# Patient Record
Sex: MALE | Race: WHITE
[De-identification: names, ages, dates, MRNs, and addresses within clinical notes are randomized per-mention and may not be internally consistent; named-entity substitution may affect disease eponyms.]

---

## 2019-11-12 ENCOUNTER — HOSPITAL ENCOUNTER (EMERGENCY)
Dept: HOSPITAL 63 - ER | Age: 29
LOS: 1 days | Discharge: ANOTHER HOSPITAL | End: 2019-11-13
Payer: SELF-PAY

## 2019-11-12 VITALS — BODY MASS INDEX: 33.06 KG/M2 | HEIGHT: 65 IN | WEIGHT: 198.42 LBS

## 2019-11-12 DIAGNOSIS — E86.0: ICD-10-CM

## 2019-11-12 DIAGNOSIS — K80.50: Primary | ICD-10-CM

## 2019-11-12 DIAGNOSIS — Z88.1: ICD-10-CM

## 2019-11-12 DIAGNOSIS — K59.00: ICD-10-CM

## 2019-11-12 DIAGNOSIS — E83.42: ICD-10-CM

## 2019-11-12 DIAGNOSIS — D72.829: ICD-10-CM

## 2019-11-12 PROCEDURE — 85379 FIBRIN DEGRADATION QUANT: CPT

## 2019-11-12 PROCEDURE — 74177 CT ABD & PELVIS W/CONTRAST: CPT

## 2019-11-12 PROCEDURE — 82150 ASSAY OF AMYLASE: CPT

## 2019-11-12 PROCEDURE — 99285 EMERGENCY DEPT VISIT HI MDM: CPT

## 2019-11-12 PROCEDURE — 86803 HEPATITIS C AB TEST: CPT

## 2019-11-12 PROCEDURE — 84484 ASSAY OF TROPONIN QUANT: CPT

## 2019-11-12 PROCEDURE — 81001 URINALYSIS AUTO W/SCOPE: CPT

## 2019-11-12 PROCEDURE — 83735 ASSAY OF MAGNESIUM: CPT

## 2019-11-12 PROCEDURE — 96361 HYDRATE IV INFUSION ADD-ON: CPT

## 2019-11-12 PROCEDURE — 85025 COMPLETE CBC W/AUTO DIFF WBC: CPT

## 2019-11-12 PROCEDURE — 82550 ASSAY OF CK (CPK): CPT

## 2019-11-12 PROCEDURE — 87340 HEPATITIS B SURFACE AG IA: CPT

## 2019-11-12 PROCEDURE — 93005 ELECTROCARDIOGRAM TRACING: CPT

## 2019-11-12 PROCEDURE — 96375 TX/PRO/DX INJ NEW DRUG ADDON: CPT

## 2019-11-12 PROCEDURE — 71046 X-RAY EXAM CHEST 2 VIEWS: CPT

## 2019-11-12 PROCEDURE — 76705 ECHO EXAM OF ABDOMEN: CPT

## 2019-11-12 PROCEDURE — 36415 COLL VENOUS BLD VENIPUNCTURE: CPT

## 2019-11-12 PROCEDURE — 80076 HEPATIC FUNCTION PANEL: CPT

## 2019-11-12 PROCEDURE — 80307 DRUG TEST PRSMV CHEM ANLYZR: CPT

## 2019-11-12 PROCEDURE — 96365 THER/PROPH/DIAG IV INF INIT: CPT

## 2019-11-12 PROCEDURE — 84443 ASSAY THYROID STIM HORMONE: CPT

## 2019-11-12 PROCEDURE — 86709 HEPATITIS A IGM ANTIBODY: CPT

## 2019-11-12 PROCEDURE — 74019 RADEX ABDOMEN 2 VIEWS: CPT

## 2019-11-12 PROCEDURE — 80048 BASIC METABOLIC PNL TOTAL CA: CPT

## 2019-11-12 PROCEDURE — 85730 THROMBOPLASTIN TIME PARTIAL: CPT

## 2019-11-12 PROCEDURE — 85610 PROTHROMBIN TIME: CPT

## 2019-11-12 PROCEDURE — 86705 HEP B CORE ANTIBODY IGM: CPT

## 2019-11-12 PROCEDURE — 96372 THER/PROPH/DIAG INJ SC/IM: CPT

## 2019-11-12 PROCEDURE — 83690 ASSAY OF LIPASE: CPT

## 2019-11-12 NOTE — ED.ADGEN
Past History


Past Medical History


Deaf,  palate defect, - at birth





Adult General


Chief Complaint


Chief Complaint


".. This all started on a trip to [facility].. for baby shower.. I notice 

on the way down there.. .I started getting this stomach, chest pain.. I thought 

it was just that I was hungry... but I got down there and tried to eat.. and it 

was like knifes in my chest and stomach.. that was on this past Saturday... and 

ever since.. I have not been able to eat solids.. I can get some liquids down...

but the pain is no better..." .. " I can't hardly walk, or step off a curb... 

with the pain..."





HPI


HPI





Patient is a 29 year old male who presents with above hx and complaints of chest

epigastric and right upper quadrant abdomen pain since this past Saturday on 

.  Patient states nothing makes the pain better. Movement makes the 

pain worse. Eating solids makes the pain worse.  No history of GERD or reflux.  

No history of gallbladder disease with him or family members. Patient does have 

a history of palate disorder and deafness.  Had a replacement of titanium bone 

in ear and now has some hearing.  No specific history of bad food. Recent travel

to [facility]. No history of immunosuppression.  Has tried Pepto-Bismol at 

home  and had only increased  abdomen discomfort.   Pt. describes his pain as 

severe.





Review of Systems


Review of Systems





Constitutional: Denies fever or chills []


Eyes: Denies change in visual acuity, redness, or eye pain []


HENT: Denies nasal congestion or sore throat []


Respiratory: Denies cough or shortness of breath []


Cardiovascular: No additional information not addressed in HPI []


GI: Patient has complaints of epigastric abdominal pain, nausea,. No vomiting, 

bloody stools or diarrhea []


: Denies dysuria or hematuria []


Musculoskeletal: Denies back pain or joint pain []


Integument: Denies rash or skin lesions []


Neurologic: Denies headache, focal weakness or sensory changes []


Endocrine: Denies polyuria or polydipsia []





All other systems were reviewed and found to be within normal limits, except as 

documented in this note.





Family History


Family History


Noncontributory





Current Medications


Current Medications





Current Medications








 Medications


  (Trade)  Dose


 Ordered  Sig/Carol  Start Time


 Stop Time Status Last Admin


Dose Admin


 


 Famotidine


  (Pepcid Vial)  20 mg  1X  ONCE  19 00:30


 19 00:31 DC 19 00:52


20 MG


 


 Hydromorphone HCl


  (Dilaudid)  2 mg  1X  ONCE  19 06:00


 19 05:57 DC  





 


 Info


  (Do NOT chart on


 this entry -- for


 MONITORING)  1 each  PRN DAILY  PRN  19 00:30


 19 05:57 DC  





 


 Iohexol


  (Omnipaque 240


 Mg/ml)  30 ml  1X  ONCE  19 01:00


 19 01:01 DC 19 01:35


30 ML


 


 Iohexol


  (Omnipaque 300


 Mg/ml)  75 ml  1X  ONCE  19 01:00


 19 01:01 DC 19 01:35


75 ML


 


 Lactated Ringer's  1,000 ml @ 


 1,000 mls/hr  1X  ONCE  19 04:30


 19 05:29 DC 19 03:40


1,000 MLS/HR


 


 Levofloxacin/


 Dextrose  100 ml @ 


 100 mls/hr  1X  ONCE  19 05:30


 19 05:57 DC 19 05:39


100 MLS/HR


 


 Magnesium


 Hydroxide


  (Milk Of


 Magnesia)  2,400 mg  1X  ONCE  19 00:30


 19 00:31 DC 19 00:52


2,400 MG


 


 Metronidazole  100 ml @ 


 100 mls/hr  1X  ONCE  19 05:30


 19 05:57 DC  





 


 Metronidazole


  (Flagyl)  500 mg  1X  ONCE  19 03:00


 19 03:01 DC 19 03:06


500 MG


 


 Morphine Sulfate


  (Morphine 10mg


 Syringe)  10 mg  1X  ONCE  19 03:30


 19 03:31 DC 19 03:06


10 MG


 


 Ondansetron HCl


  (Zofran)  8 mg  1X  ONCE  19 03:30


 19 03:31 DC 19 03:06


8 MG


 


 Sucralfate


  (Carafate)  1 gm  1X  ONCE  19 00:30


 19 00:31 DC 19 00:52


1 GM


 


 Trimethoprim/


 Sulfamethoxazole


  (Bactrim Ds)  1 tab  1X  ONCE  19 03:00


 19 03:01 DC 19 03:06


1 TAB





See nursing for home meds





Allergies


Allergies





Allergies








Coded Allergies Type Severity Reaction Last Updated Verified


 


  amoxicillin Allergy Intermediate  19 Yes


 


  cefaclor Allergy Intermediate  19 Yes


 


  clavulanic acid Allergy Intermediate  19 Yes











Physical Exam


Physical Exam





Constitutional: Moderately acute distress, non-toxic appearance. Tearful. 


HENT: Normocephalic, atraumatic, bilateral external ears normal, oropharynx 

moist, no oral exudates, nose normal. Old mastoid surgery scars


Eyes: PERRLA, EOMI, conjunctiva normal, no discharge. [] 


Neck: Normal range of motion, no tenderness, supple, no stridor. [] 


Cardiovascular: Tachycardia Heart rate regular rhythm, no murmur []


Lungs & Thorax:  Bilateral breath sounds equal at apex on auscultation []


Abdomen: Bowel sounds normal, soft, right upper quadrant and epi gastric 

tenderness, no masses, no pulsatile masses. Climbs rectal exam at this time. (No

 history of tarry stools).  Rebound to right upper quadrant and epigastric.


Skin: Warm, dry, no erythema, no rash. [] Multiple tattoos.


Back: No tenderness, no CVA tenderness. [] 


Extremities: No tenderness, no cyanosis, no clubbing, ROM intact, no edema. [] 

No true psoas sign. 


Neurologic: Alert and oriented X 3, normal motor function, normal sensory 

function, no focal deficits noted. []


Psychologic: Affect anxious, judgement normal, mood normal. []





Current Patient Data


Vital Signs





                                   Vital Signs








  Date Time  Temp Pulse Resp B/P (MAP) Pulse Ox O2 Delivery O2 Flow Rate FiO2


 


19 05:07  95 20 116/62 (80) 97 Room Air  


 


19 23:20 99.0       








Lab Results





                                Laboratory Tests








Test


 19


23:44 19


23:57


 


Urine Collection Type Unknown   


 


Urine Color Yellow   


 


Urine Clarity Clear   


 


Urine pH 7.0   


 


Urine Specific Gravity 1.020   


 


Urine Protein


 Neg


(NEG-TRACE) 





 


Urine Glucose (UA)


 Neg mg/dL


(NEG) 





 


Urine Ketones (Stick)


 Neg mg/dL


(NEG) 





 


Urine Blood Neg (NEG)   


 


Urine Nitrite Neg (NEG)   


 


Urine Bilirubin Neg (NEG)   


 


Urine Urobilinogen Dipstick


 0.2 mg/dL (0.2


mg/dL) 





 


Urine Leukocyte Esterase Neg (NEG)   


 


Urine RBC 0 /HPF (0-2)   


 


Urine WBC


 1-4 /HPF (0-4)


 





 


Urine Squamous Epithelial


Cells Occ /LPF  


 





 


Urine Amorphous Sediment Present /HPF   


 


Urine Bacteria


 Few /HPF


(0-FEW) 





 


Urine Opiates Screen Neg (NEG)   


 


Urine Methadone Screen Neg (NEG)   


 


Urine Barbiturates Neg (NEG)   


 


Urine Phencyclidine Screen Neg (NEG)   


 


Urine


Amphetamine/Methamphetamine Neg (NEG)  


 





 


Urine Benzodiazepines Screen Neg (NEG)   


 


Urine Cocaine Screen Neg (NEG)   


 


Urine Cannabinoids Screen Neg (NEG)   


 


Urine Ethyl Alcohol Neg (NEG)   


 


White Blood Count


 


 14.3 x10^3/uL


(4.0-11.0)  H


 


Red Blood Count


 


 6.27 x10^6/uL


(4.30-5.70)  H


 


Hemoglobin


 


 17.7 g/dL


(13.0-17.5)  H


 


Hematocrit


 


 52.8 %


(39.0-53.0)


 


Mean Corpuscular Volume


 


 84 fL ()





 


Mean Corpuscular Hemoglobin  28 pg (25-35)  


 


Mean Corpuscular Hemoglobin


Concent 


 34 g/dL


(31-37)


 


Red Cell Distribution Width


 


 14.0 %


(11.5-14.5)


 


Platelet Count


 


 265 x10^3/uL


(140-400)


 


Neutrophils (%) (Auto)  70 % (31-73)  


 


Lymphocytes (%) (Auto)  20 % (24-48)  L


 


Monocytes (%) (Auto)  7 % (0-9)  


 


Eosinophils (%) (Auto)  2 % (0-3)  


 


Basophils (%) (Auto)  1 % (0-3)  


 


Neutrophils # (Auto)


 


 10.1 x10^3uL


(1.8-7.7)  H


 


Lymphocytes # (Auto)


 


 2.9 x10^3/uL


(1.0-4.8)


 


Monocytes # (Auto)


 


 1.0 x10^3/uL


(0.0-1.1)


 


Eosinophils # (Auto)


 


 0.3 x10^3/uL


(0.0-0.7)


 


Basophils # (Auto)


 


 0.1 x10^3/uL


(0.0-0.2)


 


Prothrombin Time


 


 10.6 SEC


(9.4-11.4)


 


Prothrombin Time INR  1.0 (0.9-1.1)  


 


Activated Partial


Thromboplast Time 


 23 SEC (23-33)





 


D-Dimer (Mariel)


 


 0.52 mg/L


(0.00-0.50)  H


 


Sodium Level


 


 143 mmol/L


(136-145)


 


Potassium Level


 


 3.6 mmol/L


(3.5-5.1)


 


Chloride Level


 


 105 mmol/L


()


 


Carbon Dioxide Level


 


 32 mmol/L


(21-32)


 


Anion Gap  6 (6-14)  


 


Blood Urea Nitrogen


 


 15 mg/dL


(8-26)


 


Creatinine


 


 1.0 mg/dL


(0.7-1.3)


 


Estimated GFR


(Cockcroft-Gault) 


 89.6  





 


Glucose Level


 


 135 mg/dL


(70-99)  H


 


Calcium Level


 


 7.8 mg/dL


(8.5-10.1)  L


 


Magnesium Level


 


 1.6 mg/dL


(1.8-2.4)  L


 


Total Bilirubin


 


 0.2 mg/dL


(0.2-1.0)


 


Direct Bilirubin


 


 0.1 mg/dL


(0.0-0.2)


 


Aspartate Amino Transferase


(AST) 


 12 U/L (15-37)


L


 


Alanine Aminotransferase (ALT)


 


 27 U/L (16-63)





 


Alkaline Phosphatase


 


 71 U/L


()


 


Creatine Kinase


 


 40 U/L


()


 


Troponin I Quantitative


 


 < 0.017 ng/mL


(0-0.055)


 


Total Protein


 


 6.5 g/dL


(6.4-8.2)


 


Albumin


 


 2.9 g/dL


(3.4-5.0)  L


 


Amylase Level


 


 44 U/L


()


 


Lipase


 


 118 U/L


()











EKG


EKG


My interpretation EKG shows a sinus rhythm at 99 bpm. Does have some bimodal P-

wave is in left leads. Mild left axis. J point findings in V2 through V3 but no 

contralateral changes consistent with STEMI.[]





Radiology/Procedures


Radiology/Procedures


[facility] [address], [place] [address] [phone]


                                        


                                 IMAGING REPORT





                                     Signed





PATIENT: RAÚL NAVARRO SACCOUNT: [id number]     MRN#: [id number]


: 1990           LOCATION: ER              AGE: 29


SEX: M                    EXAM DT: 19         ACCESSION#: [id number]


STATUS: REG ER            ORD. PHYSICIAN: MAXIME MURRAY MD


REASON: Lower chest and abdominal pain, nausea, constipation


PROCEDURE: CHEST PA & LATERAL





CHEST PA   LATERAL


 


History: Lower chest pain.


 


Comparison: None.


 


Findings:


No consolidation or pleural effusion. Normal heart size.


 


Impression: 


1.  No acute cardiopulmonary process.


 


Electronically signed by: Adria Bah DO (2019 2:21 AM) [facility]














DICTATED AND SIGNED BY:     ADRIA BAH DO


DATE:     19





CC: MAXIME MURRAY MD; PCP,NO ~


[facility] [address], [place] [address] [phone]


                                        


                                 IMAGING REPORT





                                     Signed





PATIENT: RAÚL NAVARRO: [id number]     MRN#: [id number]


: 1990           LOCATION: ER              AGE: 29


SEX: M                    EXAM DT: 19         ACCESSION#: [id number]


STATUS: REG ER            ORD. PHYSICIAN: MAXIME MURRAY MD


REASON: Lower chest and abdomen pain, nausea, constipation


PROCEDURE: ABDOMEN SUPINE & UPRIGHT





ABDOMEN SUPINE   UPRIGHT


 


History: Abdominal pain. Nausea. Constipation.


 


Technique: Supine and upright views of the abdomen.


 


Comparison: None.


 


Findings:


No pneumatosis or pneumoperitoneum. Imaged lung bases are unremarkable. 


Minimal small bowel gas. Air and stool scattered throughout the imaged 


colon.


 


Impression: 


1.  Nonobstructed bowel gas pattern.


 


Electronically signed by: Adria Bah DO (2019 2:20 AM) [facility]














DICTATED AND SIGNED BY:     ADRIA BAH DO


DATE:     19





CC: MAXIME MURRAY MD; PCP,NO ~


[[facility] [address], [place] [address] [phone]


                                        


                                 IMAGING REPORT





                                     Signed





PATIENT: RAÚL NAVARRO: [id number]     MRN#: [id number]


: 1990           LOCATION: ER              AGE: 29


SEX: M                    EXAM DT: 19         ACCESSION#: [id number]


STATUS: REG ER            ORD. PHYSICIAN: MAXIME MURRAY MD


REASON: Abdomen pain, nausea


PROCEDURE: CT ABD PELV W/ORAL&IV CONTRAST





CT ABD PELV W/ORAL IV CONTRAST


 


History: Abdominal pain. Nausea.


 


Technique:  After the administration of oral and  intravenous contrast, CT


imaging was performed of the abdomen and pelvis.  Multiplanar images are 


reviewed. 


 


Exposure: One or more of the following individualized dose reduction 


techniques were utilized for this examination:  


1. Automated exposure control  


2. Adjustment of the mA and/or kV according to patient size  


3. Use of iterative reconstruction technique.


 


Comparison:  None


 


Findings:


Lower chest: No consolidation or pleural effusion.


 


Abdomen and pelvis: Hepatic steatosis. The spleen, adrenal glands, 


pancreas and gallbladder are unremarkable. Unremarkable appearance of the 


kidneys. Bilateral extrarenal pelvises. No intrarenal calculi. Minimal 


left pelvocaliectasis.


 


Normal appendix. No evidence of bowel obstruction. Oral contrast opacifies


to the level of the cecum. No pathologic lymphadenopathy. No ascites. 


Pelvic contents are unremarkable.


 


Bones: No pathologic osseous lesions.


 


Impression:


1.  No acute intra-abdominal or pelvic pathology.


2.  Minimal left pelvocaliectasis.


3.  Mild hepatic steatosis.


 


 


Electronically signed by: Adria Bah DO (2019 2:06 AM) [facility]














DICTATED AND SIGNED BY:     ADRIA BAH DO


DATE:     19





CC: MAXIME MURRAY MD; PCP,NO ~


][facility] [address], [place] [address]


                                 [phone]


                                        


                                 IMAGING REPORT





                                     Signed





PATIENT: RAÚL NAVARRO SACCOUNT: [id number]     MRN#: [id number]


: 1990           LOCATION: ER              AGE: 29


SEX: M                    EXAM DT: 19         ACCESSION#: [id number]


STATUS: REG ER            ORD. PHYSICIAN: MAXIME MURRAY MD


REASON: biliary colic


PROCEDURE: ABDOMEN LTD





ABDOMEN LTD


 


History: Right upper quadrant pain.


 


Comparison: CT 2019.


 


Technique: Transabdominal ultrasound images are obtained of the right 


upper quadrant.


 


Findings:


Visualized pancreas is not well seen due to overlying bowel gas.


 


Liver is increased in echogenicity. Right hepatic lobe measures 16.9 cm.  


Portal flow is hepatopedal.


 


Gallbladder sludge. No cholelithiasis. No gallbladder wall thickening.  


 


Common bile duct caliber is normal measuring 6 mm in diameter. 


 


The right kidney measures 12.4 x 4.5 x 3.9 cm in length and is without 


evidence of obstruction or stone.  


 


Visualized portions of the aorta and IVC have normal caliber.


 


IMPRESSION: 


1.  Gallbladder sludge.


 


Electronically signed by: Adria Bah DO (2019 4:39 AM) [facility]














DICTATED AND SIGNED BY:     ADRIA BAH DO


DATE:     19 0439





CC: MAXIME MURRAY MD; PCP,NO ~





Course & Med Decision Making


Course & Med Decision Making


Pertinent Labs and Imaging studies reviewed. (See chart for details)





Pt. unable to achieve any relief after two bolus LR,  and Morphine 10 x 2,  MOM,

Carfate and Pepcid.   





Discussed presentation, testing and tx plan with Dr. Jara- Transfer to his 

service at [facility].   Discussed presentation with Dr. Castillo, he will consult on pt. 

for abd. pain and biliary colic





[]





Final Impression


Final Impression


1. Epigastric and right upper quadrant pain[]


2. Biliary Colic- Gallbladder Sludge


3. Constipation


4.  Hypomagnesium  1.6


5. Leukocytosis  14.3


6. Dehydration





Dragon Disclaimer


Dragon Disclaimer


This electronic medical record was generated, in whole or in part, using a voice

 recognition dictation system.





Dragon Disclaimer


This chart was dictated in whole or in part using Voice Recognition software in 

a busy, high-work load, and often noisy Emergency Department environment.  It 

may contain unintended and wholly unrecognized errors or omissions.





Dragon Disclaimer


This chart was dictated in whole or in part using Voice Recognition software in 

a busy, high-work load, and often noisy Emergency Department environment.  It 

may contain unintended and wholly unrecognized errors or omissions.











MAXIME MURRAY MD           2019 23:24

## 2019-11-13 VITALS — SYSTOLIC BLOOD PRESSURE: 116 MMHG | DIASTOLIC BLOOD PRESSURE: 62 MMHG

## 2019-11-13 LAB
ALBUMIN SERPL-MCNC: 2.9 G/DL (ref 3.4–5)
ALP SERPL-CCNC: 71 U/L (ref 46–116)
ALT SERPL-CCNC: 27 U/L (ref 16–63)
AMORPH SED URNS QL MICRO: PRESENT /HPF
AMPHETAMINE/METHAMPHETAMINE: (no result)
AMYLASE SERPL-CCNC: 44 U/L (ref 25–115)
ANION GAP SERPL CALC-SCNC: 6 MMOL/L (ref 6–14)
APTT PPP: YELLOW S
AST SERPL-CCNC: 12 U/L (ref 15–37)
BACTERIA #/AREA URNS HPF: (no result) /HPF
BARBITURATES UR-MCNC: (no result) UG/ML
BASOPHILS # BLD AUTO: 0.1 X10^3/UL (ref 0–0.2)
BASOPHILS NFR BLD: 1 % (ref 0–3)
BENZODIAZ UR-MCNC: (no result) UG/L
BILIRUB DIRECT SERPL-MCNC: 0.1 MG/DL (ref 0–0.2)
BILIRUB SERPL-MCNC: 0.2 MG/DL (ref 0.2–1)
BILIRUB UR QL STRIP: (no result)
CA-I SERPL ISE-MCNC: 15 MG/DL (ref 8–26)
CALCIUM SERPL-MCNC: 7.8 MG/DL (ref 8.5–10.1)
CANNABINOIDS UR-MCNC: (no result) UG/L
CHLORIDE SERPL-SCNC: 105 MMOL/L (ref 98–107)
CO2 SERPL-SCNC: 32 MMOL/L (ref 21–32)
COCAINE UR-MCNC: (no result) NG/ML
CREAT SERPL-MCNC: 1 MG/DL (ref 0.7–1.3)
EOSINOPHIL NFR BLD: 0.3 X10^3/UL (ref 0–0.7)
EOSINOPHIL NFR BLD: 2 % (ref 0–3)
ERYTHROCYTE [DISTWIDTH] IN BLOOD BY AUTOMATED COUNT: 14 % (ref 11.5–14.5)
FIBRINOGEN PPP-MCNC: CLEAR MG/DL
GFR SERPLBLD BASED ON 1.73 SQ M-ARVRAT: 89.6 ML/MIN
GLUCOSE SERPL-MCNC: 135 MG/DL (ref 70–99)
GLUCOSE UR STRIP-MCNC: (no result) MG/DL
HCT VFR BLD CALC: 52.8 % (ref 39–53)
HGB BLD-MCNC: 17.7 G/DL (ref 13–17.5)
LIPASE: 118 U/L (ref 73–393)
LYMPHOCYTES # BLD: 2.9 X10^3/UL (ref 1–4.8)
LYMPHOCYTES NFR BLD AUTO: 20 % (ref 24–48)
MAGNESIUM SERPL-MCNC: 1.6 MG/DL (ref 1.8–2.4)
MCH RBC QN AUTO: 28 PG (ref 25–35)
MCHC RBC AUTO-ENTMCNC: 34 G/DL (ref 31–37)
MCV RBC AUTO: 84 FL (ref 79–100)
METHADONE SERPL-MCNC: (no result) NG/ML
MONO #: 1 X10^3/UL (ref 0–1.1)
MONOCYTES NFR BLD: 7 % (ref 0–9)
NEUT #: 10.1 X10^3UL (ref 1.8–7.7)
NEUTROPHILS NFR BLD AUTO: 70 % (ref 31–73)
NITRITE UR QL STRIP: (no result)
OPIATES UR-MCNC: (no result) NG/ML
PCP SERPL-MCNC: (no result) MG/DL
PLATELET # BLD AUTO: 265 X10^3/UL (ref 140–400)
POTASSIUM SERPL-SCNC: 3.6 MMOL/L (ref 3.5–5.1)
PROT SERPL-MCNC: 6.5 G/DL (ref 6.4–8.2)
RBC # BLD AUTO: 6.27 X10^6/UL (ref 4.3–5.7)
RBC #/AREA URNS HPF: 0 /HPF (ref 0–2)
SODIUM SERPL-SCNC: 143 MMOL/L (ref 136–145)
SP GR UR STRIP: 1.02
SQUAMOUS #/AREA URNS LPF: (no result) /LPF
UROBILINOGEN UR-MCNC: 0.2 MG/DL
WBC # BLD AUTO: 14.3 X10^3/UL (ref 4–11)
WBC #/AREA URNS HPF: (no result) /HPF (ref 0–4)

## 2019-11-13 NOTE — RAD
CT ABD PELV W/ORAL IV CONTRAST

 

History: Abdominal pain. Nausea.

 

Technique:  After the administration of oral and  intravenous contrast, CT

imaging was performed of the abdomen and pelvis.  Multiplanar images are 

reviewed. 

 

Exposure: One or more of the following individualized dose reduction 

techniques were utilized for this examination:  

1. Automated exposure control  

2. Adjustment of the mA and/or kV according to patient size  

3. Use of iterative reconstruction technique.

 

Comparison:  None

 

Findings:

Lower chest: No consolidation or pleural effusion.

 

Abdomen and pelvis: Hepatic steatosis. The spleen, adrenal glands, 

pancreas and gallbladder are unremarkable. Unremarkable appearance of the 

kidneys. Bilateral extrarenal pelvises. No intrarenal calculi. Minimal 

left pelvocaliectasis.

 

Normal appendix. No evidence of bowel obstruction. Oral contrast opacifies

to the level of the cecum. No pathologic lymphadenopathy. No ascites. 

Pelvic contents are unremarkable.

 

Bones: No pathologic osseous lesions.

 

Impression:

1.  No acute intra-abdominal or pelvic pathology.

2.  Minimal left pelvocaliectasis.

3.  Mild hepatic steatosis.

 

 

Electronically signed by: Adria Bah DO (11/13/2019 2:06 AM) [facility]

## 2019-11-13 NOTE — RAD
CHEST PA   LATERAL

 

History: Lower chest pain.

 

Comparison: None.

 

Findings:

No consolidation or pleural effusion. Normal heart size.

 

Impression: 

1.  No acute cardiopulmonary process.

 

Electronically signed by: Adria Bah DO (11/13/2019 2:21 AM) [facility]

## 2019-11-13 NOTE — RAD
ABDOMEN SUPINE   UPRIGHT

 

History: Abdominal pain. Nausea. Constipation.

 

Technique: Supine and upright views of the abdomen.

 

Comparison: None.

 

Findings:

No pneumatosis or pneumoperitoneum. Imaged lung bases are unremarkable. 

Minimal small bowel gas. Air and stool scattered throughout the imaged 

colon.

 

Impression: 

1.  Nonobstructed bowel gas pattern.

 

Electronically signed by: Adria Bah DO (11/13/2019 2:20 AM) [facility]

## 2019-11-13 NOTE — RAD
ABDOMEN LTD

 

History: Right upper quadrant pain.

 

Comparison: CT November 13, 2019.

 

Technique: Transabdominal ultrasound images are obtained of the right 

upper quadrant.

 

Findings:

Visualized pancreas is not well seen due to overlying bowel gas.

 

Liver is increased in echogenicity. Right hepatic lobe measures 16.9 cm.  

Portal flow is hepatopedal.

 

Gallbladder sludge. No cholelithiasis. No gallbladder wall thickening.  

 

Common bile duct caliber is normal measuring 6 mm in diameter. 

 

The right kidney measures 12.4 x 4.5 x 3.9 cm in length and is without 

evidence of obstruction or stone.  

 

Visualized portions of the aorta and IVC have normal caliber.

 

IMPRESSION: 

1.  Gallbladder sludge.

 

Electronically signed by: Adria Bah DO (11/13/2019 4:39 AM) [facility]

## 2020-01-01 ENCOUNTER — HOSPITAL ENCOUNTER (EMERGENCY)
Dept: HOSPITAL 63 - ER | Age: 30
LOS: 1 days | Discharge: HOME | End: 2020-01-02
Payer: SELF-PAY

## 2020-01-01 VITALS — BODY MASS INDEX: 33.49 KG/M2 | HEIGHT: 65 IN | WEIGHT: 201 LBS

## 2020-01-01 DIAGNOSIS — D72.829: ICD-10-CM

## 2020-01-01 DIAGNOSIS — Z88.1: ICD-10-CM

## 2020-01-01 DIAGNOSIS — K52.9: Primary | ICD-10-CM

## 2020-01-01 DIAGNOSIS — R11.2: ICD-10-CM

## 2020-01-01 DIAGNOSIS — K80.50: ICD-10-CM

## 2020-01-01 LAB
ALBUMIN SERPL-MCNC: 3.8 G/DL (ref 3.4–5)
ALP SERPL-CCNC: 99 U/L (ref 46–116)
ALT SERPL-CCNC: 44 U/L (ref 16–63)
AMPHETAMINE/METHAMPHETAMINE: (no result)
AMYLASE SERPL-CCNC: 38 U/L (ref 25–115)
ANION GAP SERPL CALC-SCNC: 9 MMOL/L (ref 6–14)
APTT PPP: YELLOW S
AST SERPL-CCNC: 17 U/L (ref 15–37)
BACTERIA #/AREA URNS HPF: 0 /HPF
BARBITURATES UR-MCNC: (no result) UG/ML
BASOPHILS # BLD AUTO: 0.1 X10^3/UL (ref 0–0.2)
BASOPHILS NFR BLD: 1 % (ref 0–3)
BENZODIAZ UR-MCNC: (no result) UG/L
BILIRUB DIRECT SERPL-MCNC: 0.1 MG/DL (ref 0–0.2)
BILIRUB SERPL-MCNC: 0.6 MG/DL (ref 0.2–1)
BILIRUB UR QL STRIP: (no result)
CA-I SERPL ISE-MCNC: 12 MG/DL (ref 8–26)
CALCIUM SERPL-MCNC: 8.7 MG/DL (ref 8.5–10.1)
CANNABINOIDS UR-MCNC: (no result) UG/L
CHLORIDE SERPL-SCNC: 102 MMOL/L (ref 98–107)
CO2 SERPL-SCNC: 29 MMOL/L (ref 21–32)
COCAINE UR-MCNC: (no result) NG/ML
CREAT SERPL-MCNC: 0.9 MG/DL (ref 0.7–1.3)
EOSINOPHIL NFR BLD: 0.1 X10^3/UL (ref 0–0.7)
EOSINOPHIL NFR BLD: 1 % (ref 0–3)
ERYTHROCYTE [DISTWIDTH] IN BLOOD BY AUTOMATED COUNT: 14.2 % (ref 11.5–14.5)
FIBRINOGEN PPP-MCNC: CLEAR MG/DL
GFR SERPLBLD BASED ON 1.73 SQ M-ARVRAT: 101.2 ML/MIN
GLUCOSE SERPL-MCNC: 90 MG/DL (ref 70–99)
GLUCOSE UR STRIP-MCNC: (no result) MG/DL
HCT VFR BLD CALC: 46.1 % (ref 39–53)
HGB BLD-MCNC: 15.7 G/DL (ref 13–17.5)
INFLUENZA A PATIENT: NEGATIVE
INFLUENZA B PATIENT: NEGATIVE
LIPASE: 102 U/L (ref 73–393)
LYMPHOCYTES # BLD: 0.7 X10^3/UL (ref 1–4.8)
LYMPHOCYTES NFR BLD AUTO: 5 % (ref 24–48)
MCH RBC QN AUTO: 28 PG (ref 25–35)
MCHC RBC AUTO-ENTMCNC: 34 G/DL (ref 31–37)
MCV RBC AUTO: 81 FL (ref 79–100)
METHADONE SERPL-MCNC: (no result) NG/ML
MONO #: 0.6 X10^3/UL (ref 0–1.1)
MONOCYTES NFR BLD: 4 % (ref 0–9)
NEUT #: 11.7 X10^3UL (ref 1.8–7.7)
NEUTROPHILS NFR BLD AUTO: 89 % (ref 31–73)
NITRITE UR QL STRIP: (no result)
OPIATES UR-MCNC: (no result) NG/ML
PCP SERPL-MCNC: (no result) MG/DL
PLATELET # BLD AUTO: 228 X10^3/UL (ref 140–400)
POTASSIUM SERPL-SCNC: 3.7 MMOL/L (ref 3.5–5.1)
PROT SERPL-MCNC: 8.4 G/DL (ref 6.4–8.2)
RBC # BLD AUTO: 5.67 X10^6/UL (ref 4.3–5.7)
RBC #/AREA URNS HPF: 0 /HPF (ref 0–2)
SODIUM SERPL-SCNC: 140 MMOL/L (ref 136–145)
SP GR UR STRIP: >=1.03
SQUAMOUS #/AREA URNS LPF: (no result) /LPF
UROBILINOGEN UR-MCNC: 0.2 MG/DL
WBC # BLD AUTO: 13.2 X10^3/UL (ref 4–11)
WBC #/AREA URNS HPF: (no result) /HPF (ref 0–4)

## 2020-01-01 PROCEDURE — 74022 RADEX COMPL AQT ABD SERIES: CPT

## 2020-01-01 PROCEDURE — 36415 COLL VENOUS BLD VENIPUNCTURE: CPT

## 2020-01-01 PROCEDURE — 80307 DRUG TEST PRSMV CHEM ANLYZR: CPT

## 2020-01-01 PROCEDURE — 82150 ASSAY OF AMYLASE: CPT

## 2020-01-01 PROCEDURE — 83690 ASSAY OF LIPASE: CPT

## 2020-01-01 PROCEDURE — 85610 PROTHROMBIN TIME: CPT

## 2020-01-01 PROCEDURE — 96375 TX/PRO/DX INJ NEW DRUG ADDON: CPT

## 2020-01-01 PROCEDURE — 81001 URINALYSIS AUTO W/SCOPE: CPT

## 2020-01-01 PROCEDURE — 80076 HEPATIC FUNCTION PANEL: CPT

## 2020-01-01 PROCEDURE — 96374 THER/PROPH/DIAG INJ IV PUSH: CPT

## 2020-01-01 PROCEDURE — 74177 CT ABD & PELVIS W/CONTRAST: CPT

## 2020-01-01 PROCEDURE — 80048 BASIC METABOLIC PNL TOTAL CA: CPT

## 2020-01-01 PROCEDURE — 85025 COMPLETE CBC W/AUTO DIFF WBC: CPT

## 2020-01-01 PROCEDURE — 96361 HYDRATE IV INFUSION ADD-ON: CPT

## 2020-01-01 PROCEDURE — 85730 THROMBOPLASTIN TIME PARTIAL: CPT

## 2020-01-01 PROCEDURE — 87804 INFLUENZA ASSAY W/OPTIC: CPT

## 2020-01-01 PROCEDURE — 96372 THER/PROPH/DIAG INJ SC/IM: CPT

## 2020-01-01 PROCEDURE — 99285 EMERGENCY DEPT VISIT HI MDM: CPT

## 2020-01-01 NOTE — PHYS DOC
Past History


Past Medical History:  Other


Past Surgical History:  Other


Additional Past Surgical Histo:  9 EAR SURGERYS AND cleft palate


Alcohol Use:  Rarely


Drug Use:  None





Adult General


Chief Complaint


Chief Complaint:  ".. I am having really bad abdomen pain.. Nuasea and 

vomiting.. and diarrhea.. It really just started in last 24 hrs.... this af

ternoon.. I ve vomited x 6,  and 4 watery green stools... "





HPI


HPI





Patient is a 29 year old male who presents with above hx and complaints of  

generalized abdomen pain.   Patient denies any intake of bad food. No recent t

ravel. No specific ill contacts. Patient denies any history immunosuppression. 

Patient only had 2 alcoholic drinks yesterday. Patient denies any trauma. 

Patient normally healthy. Pain is more likely localized to epigastric and right 

upper quadrant. Patient states pain seems to be worse after high-fat meal. 

Patient denies previous history of gallbladder disease or biliary colic. No 

recent dark or tarry stools. No history of kidney stones. Patient follows with 

Dr. Esposito for [facility]





Review of Systems


Review of Systems





Constitutional: Subjective history of fever or chills []


Eyes: Denies change in visual acuity, redness, or eye pain []


HENT: Denies nasal congestion or sore throat []


Respiratory: Denies cough or shortness of breath []


Cardiovascular: No additional information not addressed in HPI []


GI: Complaints of abdominal pain, nausea, vomiting, and diarrhea []


: Denies dysuria or hematuria []


Musculoskeletal: Denies back pain or joint pain []


Integument: Denies rash or skin lesions []


Neurologic: Denies headache, focal weakness or sensory changes []


Endocrine: Denies polyuria or polydipsia []





All other systems were reviewed and found to be within normal limits, except as 

documented in this note.





Family History


Family History


Noncontributory





Current Medications


Current Medications


See nursing for home medications





Allergies


Allergies





Allergies








Coded Allergies Type Severity Reaction Last Updated Verified


 


  amoxicillin Allergy Intermediate  19 Yes


 


  cefaclor Allergy Intermediate  19 Yes


 


  clavulanic acid Allergy Intermediate  19 Yes











Physical Exam


Physical Exam





Constitutional: Reports he is in acute distress, non-toxic appearance. []


HENT: Normocephalic, atraumatic, bilateral external ears normal, oropharynx dry,

 no oral exudates, nose normal. []


Eyes: PERRLA, EOMI, conjunctiva normal, no discharge. [] 


Neck: Normal range of motion, no tenderness, supple, no stridor. [] 


Cardiovascular:Heart rate regular rhythm, no murmur []


Lungs & Thorax:  Bilateral breath sounds equal apex scattered wheezes 

auscultation []


Abdomen: Bowel sounds hyperactive, soft, generalized tenderness, no masses, no 

pulsatile masses. [] Rebound to right upper quadrant


Skin: Warm, dry, no erythema, no rash. Total right arm covered in tattoos


Back: No tenderness, no CVA tenderness. [] 


Extremities: No tenderness, no cyanosis, no clubbing, ROM intact, no edema. 

[]Mild psoas right


Neurologic: Alert and oriented X 3, normal motor function, normal sensory 

function, no focal deficits noted. []


Psychologic: Affect anxious, judgement normal, mood normal. []





EKG


EKG


[]





Radiology/Procedures


Radiology/Procedures


[][facility]


                                        


                                 [phone]


                                        


                                 IMAGING REPORT





                                     Signed





PATIENT: RAÚL NAVARROOUNT: [id number]     MRN#: [id number]


: 1990           LOCATION: ER              AGE: 29


SEX: M                    EXAM DT: 20         ACCESSION#: [id number]


STATUS: REG ER            ORD. PHYSICIAN: MAXIME MURRAY MD


REASON: Abdomen pain, nausea, vomiting, diarrhea


PROCEDURE: CT ABD PELV W/ORAL&IV CONTRAST





Study: CT abdomen/pelvis with intravenous contrast


 


Indication: Abdominal pain, nausea, vomiting and diarrhea.


 


Comparison: 2019


 


Technique: Helical CT imaging performed of the abdomen and pelvis after 


the intravenous administration of 74 cc Omnipaque 300 contrast. Sagittal 


and coronal reformats were obtained. 


 


One or more of the following individualized dose reduction techniques were


utilized for this examination:  


 


1. Automated exposure control


2. Adjustment of the mA and/or kV according to patient size


3. Use of iterative reconstruction technique.


 


Findings:


 


Mild basilar volume loss.


 


Hepatic steatosis. Unremarkable gallbladder, pancreas, spleen and adrenal 


glands.


 


Unchanged degree of pelvocaliectasis on the left. Symmetric cortical 


enhancement. No hydroureter bilaterally. Unremarkable urinary bladder and 


prostate. A low-attenuation focus at the base of penis in the expected 


location of the bulbous urethra is seen on image 98 series 2 measuring up 


to 2.3 cm. This is unchanged in size from the prior and is relatively 


simple in density.


 


No pericolonic inflammation. Normal appendix. Unremarkable small bowel and


stomach.


 


Unremarkable major vascular structures. No adenopathy. No free fluid or 


air.


 


No acute osseous abnormality. Redemonstrated small bone islands within the


femoral heads. Incompletely formed disc space seen at S1-S2.


 


Impression:


 


1.  No acute abnormality seen throughout the abdomen or pelvis.


2.  Unchanged low-attenuation focus in the expected location of the 


bulbous urethra favored on account of a Cowper duct cyst. This could be 


developmental or acquired and of unlikely clinical significance. The 


appearance of the kidneys and collecting system is unchanged.


3.  Hepatic steatosis.


 


Electronically signed by: JHOANA SWEENEY MD (2020 11:39 PM) [place] [id number]














DICTATED AND SIGNED BY:     JHOANA SWEENEY MD


DATE:     20 4989





CC: MAXIME MURRAY MD; PCP,NO ~


[address] [place] [address]


                                 [phone]


                                        


                                 IMAGING REPORT





                                     Signed





PATIENT: RAÚL NAVARROOUNT: [id number]     MRN#: [id number]


: 1990           LOCATION: ER              AGE: 29


SEX: M                    EXAM DT: 20         ACCESSION#: [id number]


STATUS: REG ER            ORD. PHYSICIAN: MAXIME MURRAY MD


REASON: Abdomen pain, diarrhea, nausea, fatigue


PROCEDURE: ACUTE ABDOMEN SERIES





ACUTE ABDOMEN SERIES


 


History: Abdomen pain, diarrhea, nausea, fatigue.


 


COMPARISON: Abdominal series of 2019.


 


FINDINGS:


Single view the chest is obtained. Cardiomediastinal silhouette is not 


enlarged. No evidence of pneumothorax, pleural effusion or consolidating 


infiltrate. Bones appear to be grossly intact.


 


Splenic shadow may be slightly enlarged. No evidence of pathologic 


calcification. Bowel gas pattern is nonobstructive. Bones appear grossly 


intact. No evidence of free intraperitoneal gas on upright view.


 


IMPRESSION:


1. No consolidating infiltrate in the chest.


2. Nonobstructive bowel gas pattern.


3. Possible mild splenomegaly.


 


Electronically signed by: Yannick Ray MD (2020 9:07 PM) [facility]














DICTATED AND SIGNED BY:     YANNICK RAY MD


DATE:     20 4251





CC: MAXIME MURRAY MD; PCP,NO ~





Course & Med Decision Making


Course & Med Decision Making


Pertinent Labs and Imaging studies reviewed. (See chart for details)





Patient's stay on a clear fluid diet only for the next 48 hours. No solids. No 

milk products. Clear fluids only must allow bowel rest. Takes Zantac 150 mg 

twice day. Take Zofran 8 mg up 4 times day for active vomiting. Tylenol and 

ibuprofen for pain. Follow-up primary care. Return if any concerns.











Impression:





1. Abdomen Pain


2. Acute Gastroenteritis, NVD


3. Mild Leukocytosis  13.2


4. Biliary colic





[]





Dragon Disclaimer


Dragon Disclaimer


This electronic medical record was generated, in whole or in part, using a voice

 recognition dictation system.





Departure


Departure:


Disposition:  01 HOME/RESIDENCE PRIOR TO ADM


Condition:  STABLE


Referrals:  


PCPRYLEE (PCP)


Scripts


Ranitidine Hcl (ZANTAC) 150 Mg Tablet


150 MG PO BID for GERD, Gastritis for 30 Days, #60 TAB


   Prov: MAXIME MURRAY MD         20 


Ondansetron Hcl (ZOFRAN) 8 Mg Tablet


8 MG PO QIDPRN PRN for active nause and vomiting, #30 BOTTLE


   Prov: MAXIME MURRAY MD         20





Dragon Disclaimer


This chart was dictated in whole or in part using Voice Recognition software in 

a busy, high-work load, and often noisy Emergency Department environment.  It 

may contain unintended and wholly unrecognized errors or omissions.





Dragon Disclaimer


This chart was dictated in whole or in part using Voice Recognition software in 

a busy, high-work load, and often noisy Emergency Department environment.  It 

may contain unintended and wholly unrecognized errors or omissions.











MAXIME MURRAY MD            2020 20:09

## 2020-01-01 NOTE — RAD
ACUTE ABDOMEN SERIES

 

History: Abdomen pain, diarrhea, nausea, fatigue.

 

COMPARISON: Abdominal series of 11/13/2019.

 

FINDINGS:

Single view the chest is obtained. Cardiomediastinal silhouette is not 

enlarged. No evidence of pneumothorax, pleural effusion or consolidating 

infiltrate. Bones appear to be grossly intact.

 

Splenic shadow may be slightly enlarged. No evidence of pathologic 

calcification. Bowel gas pattern is nonobstructive. Bones appear grossly 

intact. No evidence of free intraperitoneal gas on upright view.

 

IMPRESSION:

1. No consolidating infiltrate in the chest.

2. Nonobstructive bowel gas pattern.

3. Possible mild splenomegaly.

 

Electronically signed by: Yannick Ray MD (1/1/2020 9:07 PM) [facility]

## 2020-01-01 NOTE — RAD
Study: CT abdomen/pelvis with intravenous contrast

 

Indication: Abdominal pain, nausea, vomiting and diarrhea.

 

Comparison: 11/13/2019

 

Technique: Helical CT imaging performed of the abdomen and pelvis after 

the intravenous administration of 74 cc Omnipaque 300 contrast. Sagittal 

and coronal reformats were obtained. 

 

One or more of the following individualized dose reduction techniques were

utilized for this examination:  

 

1. Automated exposure control

2. Adjustment of the mA and/or kV according to patient size

3. Use of iterative reconstruction technique.

 

Findings:

 

Mild basilar volume loss.

 

Hepatic steatosis. Unremarkable gallbladder, pancreas, spleen and adrenal 

glands.

 

Unchanged degree of pelvocaliectasis on the left. Symmetric cortical 

enhancement. No hydroureter bilaterally. Unremarkable urinary bladder and 

prostate. A low-attenuation focus at the base of penis in the expected 

location of the bulbous urethra is seen on image 98 series 2 measuring up 

to 2.3 cm. This is unchanged in size from the prior and is relatively 

simple in density.

 

No pericolonic inflammation. Normal appendix. Unremarkable small bowel and

stomach.

 

Unremarkable major vascular structures. No adenopathy. No free fluid or 

air.

 

No acute osseous abnormality. Redemonstrated small bone islands within the

femoral heads. Incompletely formed disc space seen at S1-S2.

 

Impression:

 

1.  No acute abnormality seen throughout the abdomen or pelvis.

2.  Unchanged low-attenuation focus in the expected location of the 

bulbous urethra favored on account of a Cowper duct cyst. This could be 

developmental or acquired and of unlikely clinical significance. The 

appearance of the kidneys and collecting system is unchanged.

3.  Hepatic steatosis.

 

Electronically signed by: JHOANA SWEENEY MD (1/1/2020 11:39 PM) [facility]

## 2020-01-02 VITALS — SYSTOLIC BLOOD PRESSURE: 136 MMHG | DIASTOLIC BLOOD PRESSURE: 70 MMHG

## 2020-03-09 ENCOUNTER — HOSPITAL ENCOUNTER (EMERGENCY)
Dept: HOSPITAL 63 - ER | Age: 30
Discharge: HOME | End: 2020-03-09
Payer: SELF-PAY

## 2020-03-09 VITALS
WEIGHT: 206.79 LBS | DIASTOLIC BLOOD PRESSURE: 79 MMHG | BODY MASS INDEX: 34.45 KG/M2 | SYSTOLIC BLOOD PRESSURE: 112 MMHG | HEIGHT: 65 IN

## 2020-03-09 DIAGNOSIS — R51: ICD-10-CM

## 2020-03-09 DIAGNOSIS — K08.89: Primary | ICD-10-CM

## 2020-03-09 DIAGNOSIS — K03.81: ICD-10-CM

## 2020-03-09 DIAGNOSIS — Z88.1: ICD-10-CM

## 2020-03-09 PROCEDURE — 99283 EMERGENCY DEPT VISIT LOW MDM: CPT

## 2020-03-09 PROCEDURE — 96372 THER/PROPH/DIAG INJ SC/IM: CPT

## 2020-03-09 NOTE — PHYS DOC
Past History


Past Medical History:  No Pertinent History


Past Surgical History:  Other


Additional Past Surgical Histo:  9 EAR SURGERYS AND cleft palate


Alcohol Use:  Rarely


Drug Use:  None





Adult General


Chief Complaint


Chief Complaint:  EARACHE/EAR PAIN





HPI


HPI





Patient is a 29-year-old male presenting with chief complaint of cracked tooth 

increased right face pain


x two weeks slowly getting worse





Current Medications


Current Medications





Current Medications








 Medications


  (Trade)  Dose


 Ordered  Sig/Carol  Start Time


 Stop Time Status Last Admin


Dose Admin


 


 Ketorolac


 Tromethamine


  (Toradol Im)  30 mg  1X  ONCE  3/9/20 01:15


 3/9/20 01:16 UNV  














Allergies


Allergies





Allergies








Coded Allergies Type Severity Reaction Last Updated Verified


 


  amoxicillin Allergy Intermediate  11/12/19 Yes


 


  cefaclor Allergy Intermediate  11/12/19 Yes


 


  clavulanic acid Allergy Intermediate  11/12/19 Yes











Physical Exam


Physical Exam





Constitutional: Well developed, well nourished, no acute distress, non-toxic 

appearance. []


HENT: Normocephalic, atraumatic, bilateral external ears normal, oropharynx 

moist, no oral exudates, nose normal. []cracked tooth


no lad


no appreciable abscess or facial swelling


mild facial ttp


Eyes: PERRLA, EOMI, conjunctiva normal, no discharge. [] 


Neck: Normal range of motion, no tenderness, supple, no stridor. [] 


Abdomen:  soft, no tenderness, no masses, no pulsatile masses. [] 


Skin: Warm, dry, no erythema, no rash. [] 


Back: No tenderness, no CVA tenderness. [] 


Extremities: No tenderness, no cyanosis, no clubbing, ROM intact, no edema. [] 


Neurologic: Alert and oriented X 3, normal motor function, normal sensory 

function, no focal deficits noted. []


Psychologic: Affect normal, judgement normal, mood normal. []





EKG


EKG


[]





Radiology/Procedures


Radiology/Procedures


[]





Course & Med Decision Making


Course & Med Decision Making


Pertinent Labs and Imaging studies reviewed. (See chart for details)





[]abx and pain meds were given


advised see dentist this week.





Dragon Disclaimer


Dragon Disclaimer


This electronic medical record was generated, in whole or in part, using a voice

 recognition dictation system.





Departure


Departure:


Impression:  


   Primary Impression:  


   Toothache


Disposition:  01 HOME, SELF-CARE


Condition:  STABLE


Patient Instructions:  Toothache-Brief


Scripts


Hydrocodone Bit/Acetaminophen (NORCO 5-325 TABLET) 1 Each Tablet


1-2 TAB PO Q4-6HRS PRN for NAUSEA/VOMITING, #8 TAB


   Prov: YASMEEN BALBUENA MD         3/9/20 


Clindamycin Hcl (CLINDAMYCIN HCL) 300 Mg Capsule


1 CAP PO TID for tooth, #21 CAP


   Prov: YASMEEN BALBUENA MD         3/9/20











YASMEEN BALBUENA MD             Mar 9, 2020 01:19

## 2020-03-25 ENCOUNTER — HOSPITAL ENCOUNTER (EMERGENCY)
Dept: HOSPITAL 63 - ER | Age: 30
Discharge: HOME | End: 2020-03-25
Payer: SELF-PAY

## 2020-03-25 VITALS — HEIGHT: 65 IN | WEIGHT: 205.03 LBS | BODY MASS INDEX: 34.16 KG/M2

## 2020-03-25 VITALS — SYSTOLIC BLOOD PRESSURE: 146 MMHG | DIASTOLIC BLOOD PRESSURE: 71 MMHG
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
## 2020-03-25 DIAGNOSIS — Z88.1: ICD-10-CM
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
## 2020-03-25 DIAGNOSIS — S05.02XA: Primary | ICD-10-CM

## 2020-03-25 PROCEDURE — 99283 EMERGENCY DEPT VISIT LOW MDM: CPT

## 2020-03-25 NOTE — PHYS DOC
Past History


Past Medical History:  No Pertinent History


Past Surgical History:  Other


Additional Past Surgical Histo:  9 EAR SURGERYS AND cleft palate


Alcohol Use:  Rarely


Drug Use:  None





Adult General


Chief Complaint


Chief Complaint:  EYE PROBLEMS





HPI


HPI





Patient is a 30-year-old male who presents with complaint of left eye pain after

accidentally scratching his eye with contact lens. Patient denies any loss of 

vision. He just reports that eyes painful and red. He rates pain as moderate. 

Injury occurred this morning.[]





Review of Systems


Review of Systems





Constitutional: Denies fever or chills []


Eyes: Denies change in visual acuity. Complains of eye redness and pain []


Respiratory: Denies cough or shortness of breath []


Cardiovascular: No additional information not addressed in HPI []


Neurologic: Denies headache, focal weakness or sensory changes []





Current Medications


Current Medications





Current Medications








 Medications


  (Trade)  Dose


 Ordered  Sig/Carol  Start Time


 Stop Time Status Last Admin


Dose Admin


 


 Erythromycin


  (Romycin)  0.5 inch  1X  STAT  3/25/20 22:35


 3/25/20 22:38 DC 3/25/20 22:42


0.5 INCH


 


 Fluorescein Sodium


  (Ful-Nani 1mg)  1 strip  1X  ONCE  3/25/20 23:00


 3/25/20 23:01  3/25/20 22:42


1 STRIP











Allergies


Allergies





Allergies








Coded Allergies Type Severity Reaction Last Updated Verified


 


  amoxicillin Allergy Intermediate  11/12/19 Yes


 


  cefaclor Allergy Intermediate  11/12/19 Yes


 


  clavulanic acid Allergy Intermediate  11/12/19 Yes











Physical Exam


Physical Exam





Constitutional: Well developed, well nourished, no acute distress, non-toxic 

appearance. []


Eyes: PERRLA, EOMI. sclera is moderately injected with small abrasion at the 7 

o'clock position within the sclera, approaching the border of the iris [] 


Cardiovascular:Heart rate regular rhythm, no murmur []


Lungs & Thorax:  Bilateral breath sounds clear to auscultation []


Neurologic: Alert and oriented X 3, no focal deficits noted. []





Current Patient Data


Vital Signs





                                   Vital Signs








  Date Time  Temp Pulse Resp B/P (MAP) Pulse Ox O2 Delivery O2 Flow Rate FiO2


 


3/25/20 22:30 98.9 109 20 146/71 (96) 96 Room Air  











EKG


EKG


[]





Radiology/Procedures


Radiology/Procedures


[]





Course & Med Decision Making


Course & Med Decision Making


Pertinent Labs and Imaging studies reviewed. (See chart for details)





[]





Dragon Disclaimer


Dragon Disclaimer


This electronic medical record was generated, in whole or in part, using a voice

 recognition dictation system.





Departure


Departure:


Impression:  


   Primary Impression:  


   Abrasion of sclera of left eye


Disposition:  01 HOME, SELF-CARE


Condition:  STABLE


Referrals:  


PCP,NO (PCP)


Patient Instructions:  Eye - Corneal Abrasion


Scripts


Hydrocodone Bit/Acetaminophen (NORCO 5-325 TABLET) 1 Each Tablet


1 TAB PO PRN Q6HRS PRN for PAIN, #15 TAB 0 Refills


   Prov: FREDDIE CARDONA Jr. DO         3/25/20 


Erythromycin Base (Erythromycin) 1 Gm Oint...g.


0.5 INCH OP TID for infection, #3.5 GM


   Prov: FREDDIE CARDONA Jr. DO         3/25/20





Problem Qualifiers








   Primary Impression:  


   Abrasion of sclera of left eye


   Encounter type:  initial encounter  Qualified Codes:  S05.8X2A - Other 

   injuries of left eye and orbit, initial encounter








FREDDIE CARDONA Jr. DO          Mar 25, 2020 22:48

## 2020-03-28 ENCOUNTER — HOSPITAL ENCOUNTER (EMERGENCY)
Dept: HOSPITAL 63 - ER | Age: 30
Discharge: HOME | End: 2020-03-28
Payer: SELF-PAY

## 2020-03-28 VITALS — DIASTOLIC BLOOD PRESSURE: 80 MMHG | SYSTOLIC BLOOD PRESSURE: 126 MMHG

## 2020-03-28 VITALS — WEIGHT: 205.03 LBS | BODY MASS INDEX: 34.16 KG/M2 | HEIGHT: 65 IN

## 2020-03-28 DIAGNOSIS — H18.822: Primary | ICD-10-CM

## 2020-03-28 DIAGNOSIS — Z88.1: ICD-10-CM

## 2020-03-28 PROCEDURE — 99284 EMERGENCY DEPT VISIT MOD MDM: CPT

## 2020-03-28 NOTE — PHYS DOC
Past History


Past Medical History:  No Pertinent History


Past Surgical History:  Other


Additional Past Surgical Histo:  9 EAR SURGERYS AND cleft palate


Alcohol Use:  Rarely


Drug Use:  None





Adult General


Chief Complaint


Chief Complaint:  ".. I got my eye scratched.. Using cosmetic contacts... I was 

here yesterday and he started me on erythromycin but I still red.. and the 

ointment may my vision blurred..."





HPI


HPI





Patient is a 30 year old male who presents with above hx and complaints abrasion

to left eye while attempting to remove a cosmetic contact. Patient seen on 3/25 

for abrasion of his cornea.   Does have conjunctivitis of left eye. Pupils equal

react to light. No consensual light pain reflex..  Does have forcing uptake in 

sclera and cornea.  Patient denies any history immunosuppression. No specific 

ill contacts. Is up-to-date with tetanus.





Review of Systems


Review of Systems





Constitutional: Denies fever or chills []


Eyes: Complains of change in visual acuity, redness, and eye irritation left 

eye.


HENT: Denies nasal congestion or sore throat []


Respiratory: Denies cough or shortness of breath []


Cardiovascular: No additional information not addressed in HPI []


GI: Denies abdominal pain, nausea, vomiting, bloody stools or diarrhea []


: Denies dysuria or hematuria []


Musculoskeletal: Denies back pain or joint pain []


Integument: Denies rash or skin lesions []


Neurologic: Denies headache, focal weakness or sensory changes []


Endocrine: Denies polyuria or polydipsia []





All other systems were reviewed and found to be within normal limits, except as 

documented in this note.





Family History


Family History


Noncontributory





Current Medications


Current Medications


See nursing for home meds





Allergies


Allergies





Allergies








Coded Allergies Type Severity Reaction Last Updated Verified


 


  amoxicillin Allergy Intermediate  11/12/19 Yes


 


  cefaclor Allergy Intermediate  11/12/19 Yes


 


  clavulanic acid Allergy Intermediate  11/12/19 Yes











Physical Exam


Physical Exam





Constitutional: Well developed, well nourished, mild distress, non-toxic 

appearance. []


HENT: Normocephalic, atraumatic, bilateral external ears normal, oropharynx 

moist, no oral exudates, nose normal. []


Eyes: PERRLA, EOMI, right conjunctiva normal, no discharge. []Except findings in

 left eye as per history of present illness.  Marked myosis


Neck: Normal range of motion, no tenderness, supple, no stridor. [] 


Cardiovascular:Heart rate regular rhythm, no murmur []


Lungs & Thorax:  Bilateral breath sounds equal at apex auscultation []


Abdomen: Bowel sounds normal, soft, no tenderness, no masses, no pulsatile 

masses. [] 


Skin: Warm, dry, no erythema, no rash. [] 


Back: No tenderness, no CVA tenderness. [] 


Extremities: No tenderness, no cyanosis, no clubbing, ROM intact, no edema. [] 


Neurologic: Alert and oriented X 3, normal motor function, normal sensory f

unction, no focal deficits noted. []


Psychologic: Affect anxious, judgement normal, mood normal. []





EKG


EKG


[]





Radiology/Procedures


Radiology/Procedures


[]





Course & Med Decision Making


Course & Med Decision Making


Pertinent Labs and Imaging studies reviewed. (See chart for details)





Patient to stop erythromycin ointment. To apply Polysporin small amount 4 times 

a day to left eye.  Use toradol eye drops two four times a day.  For earlier 

appointment with his ophthalmologist.  Return if worsening, redness, or decisio

n. Expect some blurring of vision with the ointment. Not to  use contacts.





Impression:





1. Cornea and sclera abrasion left eye





[]





Dragon Disclaimer


Dragon Disclaimer


This electronic medical record was generated, in whole or in part, using a voice

 recognition dictation system.





Departure


Departure:


Disposition:  01 HOME/RESIDENCE PRIOR TO ADM


Condition:  STABLE


Referrals:  


PCP,NO (PCP)





Dragon Disclaimer


This chart was dictated in whole or in part using Voice Recognition software in 

a busy, high-work load, and often noisy Emergency Department environment.  It 

may contain unintended and wholly unrecognized errors or omissions.





Dragon Disclaimer


This chart was dictated in whole or in part using Voice Recognition software in 

a busy, high-work load, and often noisy Emergency Department environment.  It 

may contain unintended and wholly unrecognized errors or omissions.











MAXIME MURRAY MD           Mar 28, 2020 02:12

## 2020-05-08 ENCOUNTER — HOSPITAL ENCOUNTER (EMERGENCY)
Dept: HOSPITAL 63 - ER | Age: 30
Discharge: HOME | End: 2020-05-08
Payer: SELF-PAY

## 2020-05-08 VITALS
SYSTOLIC BLOOD PRESSURE: 126 MMHG | BODY MASS INDEX: 34.16 KG/M2 | DIASTOLIC BLOOD PRESSURE: 80 MMHG | WEIGHT: 205.03 LBS | HEIGHT: 65 IN

## 2020-05-08 DIAGNOSIS — Z88.8: ICD-10-CM

## 2020-05-08 DIAGNOSIS — Z88.1: ICD-10-CM

## 2020-05-08 DIAGNOSIS — R60.0: ICD-10-CM

## 2020-05-08 DIAGNOSIS — Z98.890: ICD-10-CM

## 2020-05-08 DIAGNOSIS — H70.892: Primary | ICD-10-CM

## 2020-05-08 LAB
ALBUMIN SERPL-MCNC: 3.7 G/DL (ref 3.4–5)
ALBUMIN/GLOB SERPL: 0.8 {RATIO} (ref 1–1.7)
ALP SERPL-CCNC: 113 U/L (ref 46–116)
ALT SERPL-CCNC: 68 U/L (ref 16–63)
ANION GAP SERPL CALC-SCNC: 6 MMOL/L (ref 6–14)
AST SERPL-CCNC: 23 U/L (ref 15–37)
BASOPHILS # BLD AUTO: 0.1 X10^3/UL (ref 0–0.2)
BASOPHILS NFR BLD: 1 % (ref 0–3)
BILIRUB SERPL-MCNC: 0.5 MG/DL (ref 0.2–1)
BUN/CREAT SERPL: 11 (ref 6–20)
CA-I SERPL ISE-MCNC: 9 MG/DL (ref 8–26)
CALCIUM SERPL-MCNC: 9.1 MG/DL (ref 8.5–10.1)
CHLORIDE SERPL-SCNC: 102 MMOL/L (ref 98–107)
CO2 SERPL-SCNC: 31 MMOL/L (ref 21–32)
CREAT SERPL-MCNC: 0.8 MG/DL (ref 0.7–1.3)
EOSINOPHIL NFR BLD: 0.2 X10^3/UL (ref 0–0.7)
EOSINOPHIL NFR BLD: 2 % (ref 0–3)
ERYTHROCYTE [DISTWIDTH] IN BLOOD BY AUTOMATED COUNT: 14.1 % (ref 11.5–14.5)
GFR SERPLBLD BASED ON 1.73 SQ M-ARVRAT: 115.1 ML/MIN
GLOBULIN SER-MCNC: 4.7 G/DL (ref 2.2–3.8)
GLUCOSE SERPL-MCNC: 103 MG/DL (ref 70–99)
HCT VFR BLD CALC: 47.4 % (ref 39–53)
HGB BLD-MCNC: 15.9 G/DL (ref 13–17.5)
LYMPHOCYTES # BLD: 1.8 X10^3/UL (ref 1–4.8)
LYMPHOCYTES NFR BLD AUTO: 22 % (ref 24–48)
MCH RBC QN AUTO: 28 PG (ref 25–35)
MCHC RBC AUTO-ENTMCNC: 34 G/DL (ref 31–37)
MCV RBC AUTO: 84 FL (ref 79–100)
MONO #: 0.7 X10^3/UL (ref 0–1.1)
MONOCYTES NFR BLD: 8 % (ref 0–9)
NEUT #: 5.6 X10^3UL (ref 1.8–7.7)
NEUTROPHILS NFR BLD AUTO: 67 % (ref 31–73)
PLATELET # BLD AUTO: 258 X10^3/UL (ref 140–400)
POTASSIUM SERPL-SCNC: 4 MMOL/L (ref 3.5–5.1)
PROT SERPL-MCNC: 8.4 G/DL (ref 6.4–8.2)
RBC # BLD AUTO: 5.63 X10^6/UL (ref 4.3–5.7)
SODIUM SERPL-SCNC: 139 MMOL/L (ref 136–145)
WBC # BLD AUTO: 8.3 X10^3/UL (ref 4–11)

## 2020-05-08 PROCEDURE — 96365 THER/PROPH/DIAG IV INF INIT: CPT

## 2020-05-08 PROCEDURE — 70487 CT MAXILLOFACIAL W/DYE: CPT

## 2020-05-08 PROCEDURE — 96368 THER/DIAG CONCURRENT INF: CPT

## 2020-05-08 PROCEDURE — 96366 THER/PROPH/DIAG IV INF ADDON: CPT

## 2020-05-08 PROCEDURE — 85025 COMPLETE CBC W/AUTO DIFF WBC: CPT

## 2020-05-08 PROCEDURE — 36415 COLL VENOUS BLD VENIPUNCTURE: CPT

## 2020-05-08 PROCEDURE — 99285 EMERGENCY DEPT VISIT HI MDM: CPT

## 2020-05-08 PROCEDURE — 86140 C-REACTIVE PROTEIN: CPT

## 2020-05-08 PROCEDURE — 80053 COMPREHEN METABOLIC PANEL: CPT

## 2020-05-08 NOTE — PHYS DOC
Past History


Past Medical History:  No Pertinent History


Past Surgical History:  Other


Additional Past Surgical Histo:  9 EAR SURGERYS AND cleft palate


Alcohol Use:  Rarely


Drug Use:  None





General Adult


EDM:


Chief Complaint:  EARACHE/EAR PAIN





HPI:


HPI:


30-year-old male past medical history significant for recurrent left middle ear 

infections, status post surgery 11 years ago (in [place]), reports "titanium" 

replaced his middle ear bones, presents to the ED with complaints of swelling, 

pain and redness to his left ear behind his earlobe.  Has no routine ENT 

follow-up.  Has no PMD.  Takes no routine antibiotics, not on daily steroids, no

h/o  immunocompromise state, is not a diabetic.  Denies any recent upper 

respiratory infection. Reports h/o angioedema to ceclor, rash with amoxicillin.





Review of systems: Denies associated fever, chills, sore throat, cough, dyspnea,

orthopnea, chest pain, nasal congestion, sinus pressure, rhinorrhea, neck aurelio

n/stiffness, headache, blurry vision, n/v/d/c, neuro deficits or back pain.





Allergies:


Allergies:





Allergies








Coded Allergies Type Severity Reaction Last Updated Verified


 


  amoxicillin Allergy Intermediate  19 Yes


 


  cefaclor Allergy Intermediate  19 Yes


 


  clavulanic acid Allergy Intermediate  19 Yes











Physical Exam:


PE:





Constitutional: Well developed, poor hygiene, no acute distress, non-toxic 

appearance, afebrile


HENT: Normocephalic, atraumatic, dandruff, oropharynx moist, no oral exudates, 

unremarkable right tympanic membrane, left TM not visualized, granulation tissue

at left external canal, erythema posterior to left ear, no obliteration of 

auricular crease, no trismus


Eyes:  EOMI, conjunctiva normal, no discharge. [] 


Neck: Normal range of motion, no tenderness, supple, no stridor. [] 


Cardiovascular:Heart rate regular rhythm, no murmur []


Lungs & Thorax:  Bilateral breath sounds clear to auscultation []


Abdomen: Bowel sounds normal, soft, no tenderness, no masses, no pulsatile 

masses. [] 


Skin: Warm, dry, no erythema, no rash. [] 


Back: No tenderness, no CVA tenderness. [] 


Extremities: No tenderness, no cyanosis, no clubbing, ROM intact, no edema. [] 


Neurologic: Alert and oriented X 3, normal motor function, normal sensory 

function, no focal deficits noted. []


Psychologic: Affect normal, judgement normal, mood normal. []





Current Patient Data:


Vital Signs:


HD stable, afebrile





EKG:


EKG:


[]





Radiology/Procedures:


Radiology/Procedures:


[]IMAGING REPORT





                                     Signed





PATIENT: RAÚL NAVARROOUNT: [id number]     MRN#: [id number]


: 1990           LOCATION: ER              AGE: 30


SEX: M                    EXAM DT: 20         ACCESSION#: [id number]


STATUS: REG ER            ORD. PHYSICIAN: ADRIANA ABURTO DO


REASON: left mastoiditis


PROCEDURE: CT MAXILLOFACIAL W/CONTRAST





EXAM: CT Maxillofacial with IV contrast


 


INDICATION: Left mastoiditis


 


TECHNIQUE:  Multi-detector row CT images were obtained through the 


maxillofacial region with the use of IV contrast. Post-processing 


reconstructed images were obtained for interpretation. All CT scans 


performed at this facility utilize dose optimization techniques as 


appropriate to the exam, including the following: Automated exposure 


control and adjustment of the mA and/or KV according to patient size (this


includes techniques or standardized protocols for targeted exams where 


dose is indication/reason for exam).


 


IV CONTRAST: Administered


 


COMPARISON: None


 


FINDINGS: 


 


OSSEOUS: No evidence of fracture or bone destruction. Multiple dental 


caries are present, most conspicuously in the bilateral maxillary molar 


teeth and in the right maxillary premolar teeth. Incidental congenital 


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 


cervical lordosis in the visualized upper cervical spine.


 


VISUALIZED INTRACRANIAL STRUCTURES:  Unremarkable. No evidence of venous 


sinus thrombosis or epidural abscess..


 


ORBITS: Orbital contents are unremarkable..


 


SINUSES:  Scattered mucosal thickening in the right maxillary sinus. 


Otherwise the paranasal sinuses are clear. A left mastoid effusion is 


present along with soft tissue in the left middle ear cavity but the left 


external auditory canal shows wall thickening and periauricular soft 


tissue thickening as well. The right mastoids are slightly hypoplastic. 


The right outer and middle ears are unremarkable.


 


SOFT TISSUES:  Unremarkable.


 


IMPRESSION:


 


Findings compatible with left otomastoiditis without complicating 


features.


 


Electronically signed by: Jovon Meneses MD (2020 3:00 PM) 


[id number]














DICTATED AND SIGNED BY:     JOVON MENESES MD


DATE:     20 1500





CC: PCPRYLEE; ADRIANA ABURTO DO ~


Impressions:


Concern for acute on chronic left otomastoiditis, malignant otitis externa 

considered.  Patient afebrile with no leukocytosis, elevated CRP at 16.  I 

discussed patient's care with Dr. Evans, ENT on-call who recommends 

outpatient Cleocin and Keflex (will do bactim instead given allergy hx) and 

Cortisporin, along with ENT outpatient follow-up information on Monday.  Strict 

ED return precautions for fever, neurologic deficits, neck pain/stiffness, 

trismus, headache or blurry vision. Written prescriptions given. 





Pt developed pruritic urticarial at 1800 over chest and back. IV dexamethasone, 

pepcid and oral benadryl given. Pt observed in ed -pt w/no angioedema, no 

pharyngeal edema, clear speech, no drooling. Strict ed return precautions for 

angioedema/breathing difficulties.





I spoken with the patient and her caregivers.  I explained the patient's 

condition, diagnoses and treatment plan based on the information available to me

 at this time.  I have answered the patient and her caregiver's questions and 

addressed any concerns.  The patient and her caregivers have a good 

understanding of patient's diagnosis, condition and treatment plan as can be 

expected at this point.  Vital signs have been stable.  Patient's condition is 

stable and appropriate for discharge from the emergency department. 





Patient will pursue further outpatient evaluation with primary care physician or

 other designated or consulting physician as outlined in the discharge 

instructions.  The patient and/or caregivers are agreeable to this plan of care 

and follow-up instructions have been explained in detail.  The patient and/or 

caregivers have received these instructions in written form and have expressed 

an understanding of the discharge instructions.  The patient and/or caregivers 

are aware that any significant change of condition or worsening of symptoms 

should prompt immediate return to this or the closest emergency department or 

call to 911.





Course & Med Decision Making:


Course & Med Decision Making


Pertinent Labs and Imaging studies reviewed. (See chart for details)





[]





Dragon Disclaimer:


Dragon Disclaimer:


This electronic medical record was generated, in whole or in part, using a voice

 recognition dictation system.





Departure


Departure:


Impression:  


   Primary Impression:  


   Mastoiditis of left side


Disposition:   HOME/RESIDENCE PRIOR TO ADM


Condition:  STABLE


Referrals:  


PCPRYLEE (PCP)








TANISHA EVANS DO


Patient Instructions:  Mastoiditis











ADRIANA ABURTO DO                May 8, 2020 14:07

## 2020-05-08 NOTE — RAD
EXAM: CT Maxillofacial with IV contrast

 

INDICATION: Left mastoiditis

 

TECHNIQUE:  Multi-detector row CT images were obtained through the 

maxillofacial region with the use of IV contrast. Post-processing 

reconstructed images were obtained for interpretation. All CT scans 

performed at this facility utilize dose optimization techniques as 

appropriate to the exam, including the following: Automated exposure 

control and adjustment of the mA and/or KV according to patient size (this

includes techniques or standardized protocols for targeted exams where 

dose is indication/reason for exam).

 

IV CONTRAST: Administered

 

COMPARISON: None

 

FINDINGS: 

 

OSSEOUS: No evidence of fracture or bone destruction. Multiple dental 

caries are present, most conspicuously in the bilateral maxillary molar 

teeth and in the right maxillary premolar teeth. Incidental congenital 

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 

cervical lordosis in the visualized upper cervical spine.

 

VISUALIZED INTRACRANIAL STRUCTURES:  Unremarkable. No evidence of venous 

sinus thrombosis or epidural abscess..

 

ORBITS: Orbital contents are unremarkable..

 

SINUSES:  Scattered mucosal thickening in the right maxillary sinus. 

Otherwise the paranasal sinuses are clear. A left mastoid effusion is 

present along with soft tissue in the left middle ear cavity but the left 

external auditory canal shows wall thickening and periauricular soft 

tissue thickening as well. The right mastoids are slightly hypoplastic. 

The right outer and middle ears are unremarkable.

 

SOFT TISSUES:  Unremarkable.

 

IMPRESSION:

 

Findings compatible with left otomastoiditis without complicating 

features.

 

Electronically signed by: Meg Meneses MD (5/8/2020 3:00 PM) 

[id number]

## 2020-08-16 ENCOUNTER — HOSPITAL ENCOUNTER (EMERGENCY)
Dept: HOSPITAL 63 - ER | Age: 30
Discharge: HOME | End: 2020-08-16
Payer: SELF-PAY

## 2020-08-16 VITALS — WEIGHT: 207.23 LBS | BODY MASS INDEX: 34.53 KG/M2 | HEIGHT: 65 IN

## 2020-08-16 VITALS — SYSTOLIC BLOOD PRESSURE: 133 MMHG | DIASTOLIC BLOOD PRESSURE: 77 MMHG
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
## 2020-08-16 DIAGNOSIS — S06.0X0A: Primary | ICD-10-CM
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
## 2020-08-16 DIAGNOSIS — S16.1XXA: ICD-10-CM

## 2020-08-16 DIAGNOSIS — Z88.1: ICD-10-CM

## 2020-08-16 DIAGNOSIS — H92.02: ICD-10-CM
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
## 2020-08-16 PROCEDURE — 70450 CT HEAD/BRAIN W/O DYE: CPT

## 2020-08-16 PROCEDURE — 99285 EMERGENCY DEPT VISIT HI MDM: CPT

## 2020-08-16 PROCEDURE — 72125 CT NECK SPINE W/O DYE: CPT

## 2020-08-16 NOTE — PHYS DOC
Past History


Past Medical History:  No Pertinent History


Past Surgical History:  Other


Additional Past Surgical Histo:  9 EAR SURGERYS AND cleft palate


Alcohol Use:  Rarely


Drug Use:  None





General Adult


EDM:


Chief Complaint:  HEAD INJURY/TRAUMA





HPI:


HPI:





Patient is a 30 year old male who presents for evaluation of left-sided head 

injury after fall.  Patient states he was moving boxes in his house when he fell

and hit his head on a dresser.  He states he was knocked out and found himself 

on the floor.  Patient has some mild paraspinal neck pain and a moderate headach

e.  Furthermore patient has recurrent nausea since the injury.  This occurred 

earlier today.  There is no significant visual signs of injury however.  Patient

does have a history of multiple ear surgeries in the past.  Patient has been 

having some left ear pain before this fall.  He is concerned about a possible 

infection.  He states he called his family doctor and was advised they could not

get him in to be seen.  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





Review of Systems:


Review of Systems:


Constitutional:  Denies fever or chills 


Eyes:  Denies change in visual acuity 


HENT:  Denies nasal congestion or sore throat 


Respiratory:  Denies cough or shortness of breath 


Cardiovascular:  Denies chest pain or edema 


GI:  Denies abdominal pain, has nausea but no vomiting, bloody stools or 

diarrhea 


: Denies dysuria 


Musculoskeletal:  Denies back pain or joint pain, mild paraspinal neck pain


Integument:  Denies rash 


Neurologic:  has headache, but no focal weakness or sensory changes 


Endocrine:  Denies polyuria or polydipsia 


Lymphatic:  Denies swollen glands 


Psychiatric:  Denies depression or anxiety





Heart Score:


Risk Factors:


Risk Factors:  DM, Current or recent (<one month) smoker, HTN, HLP, family 

history of CAD, obesity.


Risk Scores:


Score 0 - 3:  2.5% MACE over next 6 weeks - Discharge Home


Score 4 - 6:  20.3% MACE over next 6 weeks - Admit for Clinical Observation


Score 7 - 10:  72.7% MACE over next 6 weeks - Early Invasive Strategies





Allergies:


Allergies:





Allergies








Coded Allergies Type Severity Reaction Last Updated Verified


 


  amoxicillin Allergy Intermediate  19 Yes


 


  cefaclor Allergy Intermediate  19 Yes


 


  clavulanic acid Allergy Intermediate  19 Yes


 


  vancomycin Allergy Intermediate RED WELTS ON ABDOMEN 20 Yes











Physical Exam:


PE:





Constitutional: Well developed, well nourished, mild acute distress, non-toxic 

appearance. []


HENT: Normocephalic, no visible signs of trauma, bilateral external ears normal,

 oropharynx moist, no oral exudates, nose normal, TM canal narrowed left side, 

no discharge. []


Eyes: PERRL, EOMI, conjunctiva normal, no discharge. [] 


Neck: Normal range of motion, no tenderness, supple, no stridor. [] 


Cardiovascular:Heart rate regular rhythm, no murmur []


Lungs & Thorax:  Bilateral breath sounds clear to auscultation []


Abdomen: Bowel sounds normal, soft, no tenderness. [] 


Skin: Warm, dry, no erythema, no rash. [] 


Back: No tenderness. [] 


Extremities: No tenderness, no cyanosis, ROM intact, no edema. [] 


Neurologic: Alert and oriented X 3, normal motor function, normal sensory 

function, no focal deficits noted. []


Psychologic: Affect normal, judgement normal, mood normal. []





EKG:


EKG:


[]





Radiology/Procedures:


Radiology/Procedures:


[facility] [address], [place] [address]


                                 [phone]


                                        


                                 IMAGING REPORT





                                     Signed





PATIENT: RAÚL NAVARROOUNT: [id number]     MRN#: [id number]


: 1990           LOCATION: ER              AGE: 30


SEX: M                    EXAM DT: 20         ACCESSION#: [id number]


STATUS: REG ER            ORD. PHYSICIAN: ANDREW MARTINEZ DO


REASON: head injury with LOC, neck pain


PROCEDURE: CT HEAD AND CERVICAL SPINE WO





PQRS Compliance Statement:


 


One or more of the following individualized dose reduction techniques were


utilized for this examination:  


1. Automated exposure control  


2. Adjustment of the mA and/or kV according to patient size  


3. Use of iterative reconstruction technique


 


 


CT HEAD AND CERVICAL SPINE WITHOUT CONTRAST


 


History: Reason: head injury with LOC, neck pain / 


 


Comparison: None.


 


Procedure: Axial images are obtained of the head from the skull base 


through the vertex without IV contrast. Noncontrast helical CT of the 


cervical spine was performed.  Axial, sagittal, and coronal 


reconstructions were obtained. 


 


Findings: 


The ventricles and sulci are normal for the patient's age. 


No mass-effect, midline shift, hemorrhage or obvious acute infarction is 


identified.  Basilar cisterns are patent.  


 


Bone windows demonstrate no significant calvarial abnormality. 


The visualized paranasal sinuses are clear. Left mastoid air cells are 


partially opacified. There is narrowing of the left external auditory 


canal.


 


There is no evidence of acute fracture or acute malalignment of the 


cervical spine. 


 


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 


rudimentary disc space of C2/C3. The facet joints at this level are mostly


fused. Less apparent there is probable congenital segmentation anomaly of 


C7-T1. There is a narrowed disc space. The vertebral bodies are taller 


than surrounding levels. There are no perched or jumped facets. The 


alignment is maintained. There is mild endplate spurring of C3/C4, C4/C5, 


C5/C6, and C6/C7.


 


Visualized soft tissues of the neck demonstrate no significant 


abnormalities. The visualized lung apices are clear.


 


IMPRESSION:


1.  No acute intracranial abnormality.  


2.  No acute fracture of the cervical spine.


 


Electronically signed by: Rey Rand MD (2020 10:56 AM) [id number]














DICTATED AND SIGNED BY:     REY RAND MD


DATE:     20 1056





CC: RYLEE KELLEY; ANDREW MARTINEZ DO ~


[]





Course & Med Decision Making:


Course & Med Decision Making


Pertinent Labs and Imaging studies reviewed. (See chart for details)





[]





Dragon Disclaimer:


Dragon Disclaimer:


This electronic medical record was generated, in whole or in part, using a voice

 recognition dictation system.





1104 stable, patient reassessed and is lucid awake and appropriate.  Patient 

likely experienced a concussion.  Patient is a prior history of concussions.  CT

 scan head and cervical spine showed chronic changes but no acute fracture or 

brain bleed.  Patient has no focal deficits or lateralizing signs.  Patient 

demonstrated a steady gait.  Patient medically cleared for discharge and head 

injury instructions given





Departure


Departure:


Impression:  


   Primary Impression:  


   Concussion


   Qualified Codes:  S06.0X1A - Concussion with loss of consciousness of 30 

   minutes or less, initial encounter


   Additional Impressions:  


   Head contusion


   Qualified Codes:  S00.83XA - Contusion of other part of head, initial 

   encounter


   Cervical strain, acute


   Qualified Codes:  S16.1XXA - Strain of muscle, fascia and tendon at neck 

   level, initial encounter


Disposition:  01 HOME/RESIDENCE PRIOR TO ADM


Condition:  STABLE


Referrals:  


PCPRYLEE (PCP)








TRUDI TIRADO MD


Patient Instructions:  Cervical Strain and Sprain with [facility], 

Concussion and Brain Injury





Additional Instructions:  


You likely had a concussion since she lost consciousness.  Follow head injury 

instructions.  Rest ice and elevate the injured areas.  Take over-the-counter 

Tylenol or ibuprofen as directed for symptom relief





Justification of Admission:


Justification of Admission:


Justification of Admission Dx:  N/A











ANDREW MARTINEZ DO              Aug 16, 2020 10:05

## 2020-08-16 NOTE — RAD
RS Compliance Statement:

 

One or more of the following individualized dose reduction techniques were

utilized for this examination:  

1. Automated exposure control  

2. Adjustment of the mA and/or kV according to patient size  

3. Use of iterative reconstruction technique

 

 

CT HEAD AND CERVICAL SPINE WITHOUT CONTRAST

 

History: Reason: head injury with LOC, neck pain / 

 

Comparison: None.

 

Procedure: Axial images are obtained of the head from the skull base 

through the vertex without IV contrast. Noncontrast helical CT of the 

cervical spine was performed.  Axial, sagittal, and coronal 

reconstructions were obtained. 

 

Findings: 

The ventricles and sulci are normal for the patient's age. 

No mass-effect, midline shift, hemorrhage or obvious acute infarction is 

identified.  Basilar cisterns are patent.  

 

Bone windows demonstrate no significant calvarial abnormality. 

The visualized paranasal sinuses are clear. Left mastoid air cells are 

partially opacified. There is narrowing of the left external auditory 

canal.

 

There is no evidence of acute fracture or acute malalignment of the 

cervical spine. 

 

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 

rudimentary disc space of C2/C3. The facet joints at this level are mostly

fused. Less apparent there is probable congenital segmentation anomaly of 

C7-T1. There is a narrowed disc space. The vertebral bodies are taller 

than surrounding levels. There are no perched or jumped facets. The 

alignment is maintained. There is mild endplate spurring of C3/C4, C4/C5, 

C5/C6, and C6/C7.

 

Visualized soft tissues of the neck demonstrate no significant 

abnormalities. The visualized lung apices are clear.

 

IMPRESSION:

1.  No acute intracranial abnormality.  

2.  No acute fracture of the cervical spine.

 

Electronically signed by: Rey Rand MD (8/16/2020 10:56 AM) [id number]
4

## 2020-09-01 ENCOUNTER — HOSPITAL ENCOUNTER (INPATIENT)
Dept: HOSPITAL 61 - 1 WEST ICU | Age: 30
LOS: 1 days | Discharge: HOME | DRG: 62 | End: 2020-09-02
Payer: SELF-PAY

## 2020-09-01 ENCOUNTER — HOSPITAL ENCOUNTER (EMERGENCY)
Dept: HOSPITAL 63 - ER | Age: 30
Discharge: ANOTHER HOSPITAL | End: 2020-09-01
Payer: SELF-PAY

## 2020-09-01 VITALS — DIASTOLIC BLOOD PRESSURE: 56 MMHG | SYSTOLIC BLOOD PRESSURE: 100 MMHG

## 2020-09-01 VITALS — SYSTOLIC BLOOD PRESSURE: 110 MMHG | DIASTOLIC BLOOD PRESSURE: 66 MMHG

## 2020-09-01 VITALS — SYSTOLIC BLOOD PRESSURE: 113 MMHG | DIASTOLIC BLOOD PRESSURE: 60 MMHG

## 2020-09-01 VITALS — SYSTOLIC BLOOD PRESSURE: 100 MMHG | DIASTOLIC BLOOD PRESSURE: 49 MMHG

## 2020-09-01 VITALS — SYSTOLIC BLOOD PRESSURE: 104 MMHG | DIASTOLIC BLOOD PRESSURE: 66 MMHG

## 2020-09-01 VITALS — BODY MASS INDEX: 32.54 KG/M2 | HEIGHT: 65 IN | WEIGHT: 195.33 LBS

## 2020-09-01 VITALS — BODY MASS INDEX: 32.95 KG/M2 | WEIGHT: 197.76 LBS | HEIGHT: 65 IN

## 2020-09-01 VITALS — DIASTOLIC BLOOD PRESSURE: 72 MMHG | SYSTOLIC BLOOD PRESSURE: 112 MMHG

## 2020-09-01 VITALS — SYSTOLIC BLOOD PRESSURE: 109 MMHG | DIASTOLIC BLOOD PRESSURE: 69 MMHG

## 2020-09-01 VITALS — SYSTOLIC BLOOD PRESSURE: 104 MMHG | DIASTOLIC BLOOD PRESSURE: 63 MMHG

## 2020-09-01 VITALS — SYSTOLIC BLOOD PRESSURE: 106 MMHG | DIASTOLIC BLOOD PRESSURE: 59 MMHG

## 2020-09-01 VITALS — SYSTOLIC BLOOD PRESSURE: 120 MMHG | DIASTOLIC BLOOD PRESSURE: 78 MMHG

## 2020-09-01 VITALS — DIASTOLIC BLOOD PRESSURE: 50 MMHG | SYSTOLIC BLOOD PRESSURE: 96 MMHG

## 2020-09-01 VITALS — DIASTOLIC BLOOD PRESSURE: 66 MMHG | SYSTOLIC BLOOD PRESSURE: 111 MMHG

## 2020-09-01 VITALS — SYSTOLIC BLOOD PRESSURE: 85 MMHG | DIASTOLIC BLOOD PRESSURE: 45 MMHG

## 2020-09-01 VITALS — SYSTOLIC BLOOD PRESSURE: 87 MMHG | DIASTOLIC BLOOD PRESSURE: 51 MMHG

## 2020-09-01 VITALS — DIASTOLIC BLOOD PRESSURE: 52 MMHG | SYSTOLIC BLOOD PRESSURE: 94 MMHG

## 2020-09-01 VITALS — SYSTOLIC BLOOD PRESSURE: 117 MMHG | DIASTOLIC BLOOD PRESSURE: 67 MMHG

## 2020-09-01 VITALS — DIASTOLIC BLOOD PRESSURE: 61 MMHG | SYSTOLIC BLOOD PRESSURE: 104 MMHG

## 2020-09-01 VITALS — DIASTOLIC BLOOD PRESSURE: 65 MMHG | SYSTOLIC BLOOD PRESSURE: 102 MMHG

## 2020-09-01 VITALS — DIASTOLIC BLOOD PRESSURE: 56 MMHG | SYSTOLIC BLOOD PRESSURE: 97 MMHG

## 2020-09-01 VITALS — DIASTOLIC BLOOD PRESSURE: 67 MMHG | SYSTOLIC BLOOD PRESSURE: 120 MMHG

## 2020-09-01 VITALS — SYSTOLIC BLOOD PRESSURE: 107 MMHG | DIASTOLIC BLOOD PRESSURE: 70 MMHG

## 2020-09-01 VITALS — DIASTOLIC BLOOD PRESSURE: 75 MMHG | SYSTOLIC BLOOD PRESSURE: 118 MMHG

## 2020-09-01 VITALS — DIASTOLIC BLOOD PRESSURE: 64 MMHG | SYSTOLIC BLOOD PRESSURE: 116 MMHG

## 2020-09-01 DIAGNOSIS — Z83.3: ICD-10-CM

## 2020-09-01 DIAGNOSIS — R20.0: ICD-10-CM

## 2020-09-01 DIAGNOSIS — H90.5: ICD-10-CM

## 2020-09-01 DIAGNOSIS — G43.909: ICD-10-CM

## 2020-09-01 DIAGNOSIS — Z20.828: ICD-10-CM

## 2020-09-01 DIAGNOSIS — I63.9: Primary | ICD-10-CM

## 2020-09-01 DIAGNOSIS — I51.7: ICD-10-CM

## 2020-09-01 DIAGNOSIS — Z87.730: ICD-10-CM

## 2020-09-01 DIAGNOSIS — Z79.899: ICD-10-CM

## 2020-09-01 DIAGNOSIS — Q76.49: ICD-10-CM

## 2020-09-01 DIAGNOSIS — E78.5: ICD-10-CM

## 2020-09-01 DIAGNOSIS — R53.1: Primary | ICD-10-CM

## 2020-09-01 DIAGNOSIS — Q21.1: ICD-10-CM

## 2020-09-01 DIAGNOSIS — G81.94: ICD-10-CM

## 2020-09-01 DIAGNOSIS — Z87.820: ICD-10-CM

## 2020-09-01 DIAGNOSIS — Z82.49: ICD-10-CM

## 2020-09-01 DIAGNOSIS — Z88.8: ICD-10-CM

## 2020-09-01 LAB
ALBUMIN SERPL-MCNC: 3.8 G/DL (ref 3.4–5)
ALBUMIN/GLOB SERPL: 0.8 {RATIO} (ref 1–1.7)
ALP SERPL-CCNC: 107 U/L (ref 46–116)
ALT SERPL-CCNC: 115 U/L (ref 16–63)
AMPHETAMINE/METHAMPHETAMINE: (no result)
ANION GAP SERPL CALC-SCNC: 9 MMOL/L (ref 6–14)
AST SERPL-CCNC: 30 U/L (ref 15–37)
BARBITURATES UR-MCNC: (no result) UG/ML
BASOPHILS # BLD AUTO: 0.1 X10^3/UL (ref 0–0.2)
BASOPHILS NFR BLD: 1 % (ref 0–3)
BENZODIAZ UR-MCNC: (no result) UG/L
BILIRUB SERPL-MCNC: 0.2 MG/DL (ref 0.2–1)
BUN/CREAT SERPL: 13 (ref 6–20)
CA-I SERPL ISE-MCNC: 13 MG/DL (ref 8–26)
CALCIUM SERPL-MCNC: 9.1 MG/DL (ref 8.5–10.1)
CANNABINOIDS UR-MCNC: (no result) UG/L
CHLORIDE SERPL-SCNC: 103 MMOL/L (ref 98–107)
CHOLEST SERPL-MCNC: 174 MG/DL (ref 0–200)
CHOLEST/HDLC SERPL: 6 {RATIO}
CO2 SERPL-SCNC: 28 MMOL/L (ref 21–32)
COCAINE UR-MCNC: (no result) NG/ML
CREAT SERPL-MCNC: 1 MG/DL (ref 0.7–1.3)
EOSINOPHIL NFR BLD: 0.2 X10^3/UL (ref 0–0.7)
EOSINOPHIL NFR BLD: 2 % (ref 0–3)
ERYTHROCYTE [DISTWIDTH] IN BLOOD BY AUTOMATED COUNT: 14.3 % (ref 11.5–14.5)
GFR SERPLBLD BASED ON 1.73 SQ M-ARVRAT: 89 ML/MIN
GLOBULIN SER-MCNC: 4.6 G/DL (ref 2.2–3.8)
GLUCOSE SERPL-MCNC: 115 MG/DL (ref 70–99)
HCT VFR BLD CALC: 44.8 % (ref 39–53)
HDLC SERPL-MCNC: 29 MG/DL (ref 40–60)
HGB BLD-MCNC: 15.1 G/DL (ref 13–17.5)
LDLC: 122 MG/DL (ref 0–100)
LYMPHOCYTES # BLD: 2.8 X10^3/UL (ref 1–4.8)
LYMPHOCYTES NFR BLD AUTO: 28 % (ref 24–48)
MCH RBC QN AUTO: 28 PG (ref 25–35)
MCHC RBC AUTO-ENTMCNC: 34 G/DL (ref 31–37)
MCV RBC AUTO: 84 FL (ref 79–100)
METHADONE SERPL-MCNC: (no result) NG/ML
MONO #: 0.9 X10^3/UL (ref 0–1.1)
MONOCYTES NFR BLD: 9 % (ref 0–9)
NEUT #: 6.1 X10^3UL (ref 1.8–7.7)
NEUTROPHILS NFR BLD AUTO: 61 % (ref 31–73)
OPIATES UR-MCNC: (no result) NG/ML
PCP SERPL-MCNC: (no result) MG/DL
PLATELET # BLD AUTO: 252 X10^3/UL (ref 140–400)
POTASSIUM SERPL-SCNC: 3.9 MMOL/L (ref 3.5–5.1)
PROT SERPL-MCNC: 8.4 G/DL (ref 6.4–8.2)
RBC # BLD AUTO: 5.37 X10^6/UL (ref 4.3–5.7)
SODIUM SERPL-SCNC: 140 MMOL/L (ref 136–145)
TRIGL SERPL-MCNC: 113 MG/DL (ref 0–150)
VLDLC: 23 MG/DL (ref 0–40)
WBC # BLD AUTO: 10 X10^3/UL (ref 4–11)

## 2020-09-01 PROCEDURE — 37195 THROMBOLYTIC THERAPY STROKE: CPT

## 2020-09-01 PROCEDURE — G0378 HOSPITAL OBSERVATION PER HR: HCPCS

## 2020-09-01 PROCEDURE — 93306 TTE W/DOPPLER COMPLETE: CPT

## 2020-09-01 PROCEDURE — 70551 MRI BRAIN STEM W/O DYE: CPT

## 2020-09-01 PROCEDURE — 71045 X-RAY EXAM CHEST 1 VIEW: CPT

## 2020-09-01 PROCEDURE — 85306 CLOT INHIBIT PROT S FREE: CPT

## 2020-09-01 PROCEDURE — 80061 LIPID PANEL: CPT

## 2020-09-01 PROCEDURE — 84484 ASSAY OF TROPONIN QUANT: CPT

## 2020-09-01 PROCEDURE — 86147 CARDIOLIPIN ANTIBODY EA IG: CPT

## 2020-09-01 PROCEDURE — 85730 THROMBOPLASTIN TIME PARTIAL: CPT

## 2020-09-01 PROCEDURE — 85610 PROTHROMBIN TIME: CPT

## 2020-09-01 PROCEDURE — 80307 DRUG TEST PRSMV CHEM ANLYZR: CPT

## 2020-09-01 PROCEDURE — 3E03317 INTRODUCTION OF OTHER THROMBOLYTIC INTO PERIPHERAL VEIN, PERCUTANEOUS APPROACH: ICD-10-PCS

## 2020-09-01 PROCEDURE — 36415 COLL VENOUS BLD VENIPUNCTURE: CPT

## 2020-09-01 PROCEDURE — 70450 CT HEAD/BRAIN W/O DYE: CPT

## 2020-09-01 PROCEDURE — 80053 COMPREHEN METABOLIC PANEL: CPT

## 2020-09-01 PROCEDURE — 93325 DOPPLER ECHO COLOR FLOW MAPG: CPT

## 2020-09-01 PROCEDURE — 85025 COMPLETE CBC W/AUTO DIFF WBC: CPT

## 2020-09-01 PROCEDURE — 70498 CT ANGIOGRAPHY NECK: CPT

## 2020-09-01 PROCEDURE — 99285 EMERGENCY DEPT VISIT HI MDM: CPT

## 2020-09-01 PROCEDURE — 85302 CLOT INHIBIT PROT C ANTIGEN: CPT

## 2020-09-01 PROCEDURE — 85300 ANTITHROMBIN III ACTIVITY: CPT

## 2020-09-01 PROCEDURE — 70496 CT ANGIOGRAPHY HEAD: CPT

## 2020-09-01 PROCEDURE — 96360 HYDRATION IV INFUSION INIT: CPT

## 2020-09-01 PROCEDURE — 93005 ELECTROCARDIOGRAM TRACING: CPT

## 2020-09-01 PROCEDURE — 87426 SARSCOV CORONAVIRUS AG IA: CPT

## 2020-09-01 PROCEDURE — 93312 ECHO TRANSESOPHAGEAL: CPT

## 2020-09-01 NOTE — PDOC2
CARDIAC CONSULT


DATE OF CONSULT


Date of Consult


DATE: 9/1/20 


TIME: 13:37





REASON FOR CONSULT


Reason for Consult:


Needs JAZMINE, young CVA





REFERRING PHYSICIAN


Referring Physician:


Artemio





SOURCE


Source:  Chart review, Patient





HISTORY OF PRESENT ILLNESS


HISTORY OF PRESENT ILLNESS


This is a pleasant 31 yo male admitted for complains of left sided weakness and 

facial droop. He was initially at [facility] and transferred to [facility] for further 

treatment and evaluation. This is the first time this pt had this symptoms. He 

was helped by his roommate. Reports no hx of CVA, arrhythmias or childhood heart

disease. 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

in the past with most recent over a month ago. No hx of any MVA or significant 

injury. He does not take any medications and no prior surgery. He was given tPA 

and his symptoms resolved.





PAST MEDICAL HISTORY


Past Medical History


Aside from concussions no other medical history





PAST SURGICAL HISTORY


Past Surgical History:  No pertinent history





FAMILY HISTORY


Family History:  Other (38 yo sister has an autoimmune disorder that he could 

not remember. )





SOCIAL HISTORY


Smoke:  No


ALCOHOL:  none


Drugs:  None


Lives:  Friends





ALLERGIES


ALLERGIES:  


Coded Allergies:  


     amoxicillin (Verified  Allergy, Severe, Swelling, 11/14/19)


     cefaclor (Verified  Allergy, Mild, Hives, 11/14/19)


     clavulanic acid (Verified  Allergy, Unknown, Hives, 11/14/19)





ROS


Review of System


14 point ROS evaluated with pertinent positives noted per HPI





PHYSICAL EXAM


General:  Alert, Oriented X3, Cooperative, No acute distress


HEENT:  Atraumatic, Mucous membr. moist/pink


Lungs:  Clear to auscultation, Normal air movement


Heart:  Regular rate (SR no ectopies), Normal S1, Normal S2, No murmurs


Abdomen:  Soft, No tenderness


Extremities:  No cyanosis, No edema


Skin:  No breakdown, No significant lesion


Neuro:  Normal speech, Sensation intact


Psych/Mental Status:  Mental status NL, Other (flat affect)


MUSCULOSKELETAL:  Full range of motion without pain





VITALS/I&O


VITALS/I&O:





                                   Vital Signs








  Date Time  Temp Pulse Resp B/P (MAP) Pulse Ox O2 Delivery O2 Flow Rate FiO2


 


9/1/20 13:08  63  104/63 (77)    


 


9/1/20 13:03   23  96 Room Air  


 


9/1/20 11:02 98.0       





 98.0       


 


9/1/20 07:30       98.0 











LABS


Lab:





                                Laboratory Tests








Test


 9/1/20


09:30 9/1/20


09:50


 


SARS-CoV-2 Antigen (Rapid)


 Negative


(NEGATIVE) 





 


Triglycerides Level


 


 113 mg/dL


(0-150)


 


Cholesterol Level


 


 174 mg/dL


(0-200)


 


LDL Cholesterol, Calculated


 


 122 mg/dL


(0-100)  H


 


VLDL Cholesterol, Calculated


 


 23 mg/dL


(0-40)


 


Non-HDL Cholesterol Calculated


 


 145 mg/dL


(0-129)  H


 


HDL Cholesterol


 


 29 mg/dL


(40-60)  L


 


Cholesterol/HDL Ratio  6.0  











ASSESSMENT/PLAN


ASSESSMENT/PLAN


1. Acute CVA: left side hemiparesis/facial droop resolved post tPA


2. DLP


3. Prior multiple concussions: reports being a wrestler


4. Family hx of autoimmune disorder: unclear in regards to his sister 38 yo





Recommendations


1. No rhythm ectopies so far. SR. Rapid covid and will plan for JAZMINE tomorrow if 

viral test is negative


2. Statin. Follow neuro recommendation


3. TONY NAVARRO APRN           Sep 1, 2020 13:47

## 2020-09-01 NOTE — PDOC2
NEUROLOGY CONSULT


Date of Service


DOS:


DATE: 9/1/20 


TIME: 11:46





Reason for Consult


Reason for Consult:


Stroke





Referring Physician


Referring Physician:


Dr. Conde





Source


Source:  Chart review, Patient





History of Present Illness


History of Present Illness


The patient is a 30-year-old right-handed male with onset of left sided weakness

including facial droop at 1 AM this morning. He was having an argument with his 

significant other over the phone. He tried to stand up and collapse. He had 

numbness and weakness on the entire left side. He presented to the [facility] department where Dr. Bradley determined that the patient was in the 4.5 

hour window for Alteplace and administered it. The patient had complete 

resolution of the symptoms. He says he feels very tired. There is no headache. 

He has no history of migraines, stroke, seizure, head injury, coagulopathy, 

heart problems, or family history of early stroke. 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 

profession, and does get into choke holds a lot.





Past Medical History


ENT:  Other (Hearing loss)





Past Surgical History


Past Surgical History:  Other ( cervical fusion, ear surgeries, cleft palate)





Family History


Family History:  No pertinent hx





Social History


Social History


Has significant other, no alcohol, tobacco, street drugs, 





Current Medications


Current Medications





Current Medications


Labetalol HCl (Normodyne Iv Push) 10 mg PRN Q10MIN  PRN IVP HYPERTENSION;  Start

9/1/20 at 08:30


Nicardipine HCl 50 mg/Sodium Chloride 250 ml @  25 mls/hr CONT PRN  PRN IV 

ELEVATED BP, SEE COMMENTS;  Start 9/1/20 at 08:30


Acetaminophen (Tylenol) 650 mg PRN Q6HRS  PRN PO MILD PAIN / TEMP > 100.3'F;  

Start 9/1/20 at 08:30


Acetaminophen (Tylenol Supp) 325 mg PRN Q6HRS  PRN MD MILD PAIN / TEMP > 

100.3'F;  Start 9/1/20 at 08:30


Ondansetron HCl (Zofran) 4 mg PRN Q6HRS  PRN IV NAUSEA/VOMITING;  Start 9/1/20 

at 08:30





Active Scripts


Active


Reported


No Known Medications Prior To Admisstion (Info)  Each 1 Each MC 1X





Allergies


Allergies:  


Coded Allergies:  


     amoxicillin (Verified  Allergy, Severe, Swelling, 11/14/19)


     cefaclor (Verified  Allergy, Mild, Hives, 11/14/19)


     clavulanic acid (Verified  Allergy, Unknown, Hives, 11/14/19)





ROS


Review of System


Negative for fever, chills, weight loss, shortness of breath, chest pain, 

indigestion, hematochezia, melena, and dysuria.  Full 14-point review of systems

is negative.





Physical Exam


Physical Examination


General:


Well-developed, well-nourished white male in no acute distress


HEENT:


Normocephalic andatraumatic. Temporal arteriespulsatile and nontender.


Neck:


Supple without bruit, no meningismus


Musculoskeletal:


Stability:see neurologic. Gait exam:see neurologic. Tone:see 

neurologic.Strength:see neurologic.


Neurological:


Mental Status:intact, orientation, memory, attention span/concentration, 

language, fund of knowledge normal. Cranial Nerves:Pupils equal and reactive to

light, extraocular movements areintact, visual fields are full to 

confrontation. Facial sensation is normal. There is no facial asymmetry. 

Vestibulo-ocular reflex is intact. Palate elevates and tongue protrudes in 

midline. All other cranial related problems are negative except as mentioned be

fore.Reflexes:2+ and symmetric with flexor plantar responses. Motor:5/5 

strength with normal tone and bulk. Coordination:Finger-nose finger and 

heel-to-shin testing are normal. Rapid alternating movements and fine finger 

movements are intact. Gait: not tested. Sensory:Normal pinprick, vibration, 

light touch, proprioception.





Vitals


VITALS





Vital Signs








  Date Time  Temp Pulse Resp B/P (MAP) Pulse Ox O2 Delivery O2 Flow Rate FiO2


 


9/1/20 11:32  71 14 100/49 (66) 98 Room Air  


 


9/1/20 11:02 98.0       





 98.0       


 


9/1/20 07:30       98.0 











Labs


Labs





Laboratory Tests








Test


 9/1/20


09:30 9/1/20


09:50


 


SARS-CoV-2 Antigen (Rapid)


 Negative


(NEGATIVE) 





 


Triglycerides Level


 


 113 mg/dL


(0-150)


 


Cholesterol Level


 


 174 mg/dL


(0-200)


 


LDL Cholesterol, Calculated


 


 122 mg/dL


(0-100)


 


VLDL Cholesterol, Calculated


 


 23 mg/dL


(0-40)


 


Non-HDL Cholesterol Calculated


 


 145 mg/dL


(0-129)


 


HDL Cholesterol


 


 29 mg/dL


(40-60)


 


Cholesterol/HDL Ratio  6.0 








Laboratory Tests








Test


 9/1/20


09:30 9/1/20


09:50


 


SARS-CoV-2 Antigen (Rapid)


 Negative


(NEGATIVE) 





 


Triglycerides Level


 


 113 mg/dL


(0-150)


 


Cholesterol Level


 


 174 mg/dL


(0-200)


 


LDL Cholesterol, Calculated


 


 122 mg/dL


(0-100)


 


VLDL Cholesterol, Calculated


 


 23 mg/dL


(0-40)


 


Non-HDL Cholesterol Calculated


 


 145 mg/dL


(0-129)


 


HDL Cholesterol


 


 29 mg/dL


(40-60)


 


Cholesterol/HDL Ratio  6.0 











Images


Images


Reviewed from [facility]:





CT CODE STROKE HEAD WO


 


No intracranial hemorrhage.


No midline shift.  Basal cisterns patent.


Ventricles and sulci are unremarkable.


No acute osseous abnormality.


Orbits and paranasal sinuses unremarkable.


 


IMPRESSION:


 


*   No acute intracranial hemorrhage. Report called to the ER at 3:50 AM.


 


*  There is some fluid in the left external auditory canal and middle ear.


Would correlate with symptoms in the region to ensure that there is not a 


otitis externa and otitis media. Some of the adjacent mastoid air cells 


are also opacified which could be from congestion or mastoiditis.





CT ANGIOGRAPHY HEAD AND NECK





INDICATION: Reason: CODE STROKE, OMNI 350, 75ml / Spl. Instructions:  / 


History: 


 


COMPARISON: None.


 


TECHNIQUE:


 


Axial CT images obtained through the head and neck arterial vasculature 


with intravenous contrast.  3D images were processed per protocol.  


 


 


Estimates of carotid stenosis based on criteria that correlates with 


NASCET.


 


One or more of the following individualized dose reduction techniques were


utilized for this examination:  1. Automated exposure control;  2. 


Adjustment of the mA and/or kV according to patient size;  3. Use of 


iterative reconstruction technique.


 


FINDINGS:


 


 


 


Neck Angio:


 


The vertebral arteries are patent.


The common carotid arteries and internal carotid arteries are patent.


Right vertebral artery is small in caliber.


 


Brain Angio:


 


Proximal anterior cerebral arteries are patent.


Proximal posterior cerebral arteries are patent. Not well seen distally 


secondary to small size.


Proximal middle cerebral arteries are patent.


Basilar artery is patent.


 


 


Other Findings:


 


There is some bowing of the nasal septum.


Mucosal thickening right maxillary sinus. Odontogenic disease.


Degenerative changes the spine. More than typically seen for the patient's


age with neural foraminal stenosis identified. Scoliotic curvature.


Prominent size of the pharyngeal tonsils. Also some prominence of adenoid 


tonsils. Osseous fusion of C1 to the occipital condyle with chronic 


appearing deformity of C2. There is also osseous fusion of C2 and C3 


vertebral bodies and facet joints.


 


IMPRESSION:


 


*  Vertebral, internal and common carotid arteries are patent within the 


neck.


 


*  Proximal middle, anterior and posterior cerebral arteries are patent.  


If there is high concern for stroke MRI could better assess for a more 


peripheral region of ischemia that would not be well seen on CT.


 


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 


with osseous fusion. There is also some degenerative changes which is more


than typically seen for the patient's age.


 


*  Prominence of the pharyngeal and adenoid tonsils. Could be from 


hyperplasia unless the patient is having symptoms of tonsillitis.


 


*  Odontogenic disease.





Assessment/Plan


Assessment/Plan


Impression:


Stroke symptoms in the young, symptoms have resolved after alteplase, no obvious

 risk factors. Carotid or vertebral dissection would have been an attractive 

explanation, but none is present.


Cervical fusion


Possible left external otitis, no symptoms


Hyperlipidemia





Recommendations:


ICU observation


Transthoracic and transesophageal echocardiogram


MRI brain, delay till tomorrow morning to get images 24 hours after alteplase


Start aspirin after 24 hours


Statin


Rehabilitation screening


Also see stroke orders


Discussed with Dr. Conde





Thank you for letting me help with the patient care.











MARISELA BROWN MD            Sep 1, 2020 11:47

## 2020-09-01 NOTE — EKG
[facility] [address], [place] [address]

Test Date:    2020               Test Time:    03:46:15

Pat Name:     RAÚL NAVARRO      Department:   

Patient ID:   [id number]           Room:          

Gender:       M                        Technician:   

:          [id number]               Requested By: DARLEEN GONZALES

Order Number: [id number]            Reading MD:   Javier Pinto MD

                                 Measurements

Intervals                              Axis          

Rate:         98                       P:            31

VT:           168                      QRS:          6

QRSD:         106                      T:            19

QT:           334                                    

QTc:          428                                    

                           Interpretive Statements

SINUS RHYTHM

Electronically Signed On 2020 9:14:37 CDT by Javier Pinto MD

## 2020-09-01 NOTE — RAD
INDICATION: Reason: CODE STROKE / Spl. Instructions:  / History: 

 

COMPARISON: August 16, 2020

 

TECHNIQUE: 

 

Axial CT images obtained through the head without intravenous contrast.

 

One or more of the following individualized dose reduction techniques were

utilized for this examination:  1. Automated exposure control;  2. 

Adjustment of the mA and/or kV according to patient size;  3. Use of 

iterative reconstruction technique.

 

FINDINGS:

 

No intracranial hemorrhage.

No midline shift.  Basal cisterns patent.

Ventricles and sulci are unremarkable.

No acute osseous abnormality.

Orbits and paranasal sinuses unremarkable.

 

IMPRESSION:

 

*   No acute intracranial hemorrhage. Report called to the ER at 3:50 AM.

 

*  There is some fluid in the left external auditory canal and middle ear.

Would correlate with symptoms in the region to ensure that there is not a 

otitis externa and otitis media. Some of the adjacent mastoid air cells 

are also opacified which could be from congestion or mastoiditis.

 

Electronically signed by: Herbert Moody MD (9/1/2020 3:56 AM) 

[id number]

## 2020-09-01 NOTE — PHYS DOC
General Adult


EDM:


Chief Complaint:  NEURO SYMPTOMS/DEFICITS





HPI:


HPI:


30-year-old male presents via EMS as a code stroke.  The patient was reported to

have left-sided weakness and facial droop on the left that started around 1 AM. 

The patient was having an argument with significant other when he started to 

notice a numbness/sleepy sensation on the left leg and then the left arm.  The 

patient just after falling asleep so he went to stand up and he immediately 

collapsed.  He is unable to lift his left arm or left leg.  He also has a left-

sided facial droop.  He states that he has decreased sensation on the left side 

of his face and arm.  He has decreased but present sensation in the left lower 

leg down to the knee.  Below that he does not feel pinprick or light touch.  

Patient states he is never had symptoms like this before.  He denies alcohol or 

drug use.  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

no diagnosed brain trauma.





Review of Systems:


Review of Systems:


Constitutional:  Denies fever or chills 


Eyes:  Denies change in visual acuity 


HENT:  Denies nasal congestion or sore throat 


Respiratory:  Denies cough or shortness of breath 


Cardiovascular:  Denies chest pain or edema 


GI:  Denies abdominal pain, nausea, vomiting, bloody stools or diarrhea 


: Denies dysuria 


Musculoskeletal:  Denies back pain or joint pain 


Integument:  Denies rash 


Neurologic: Left-sided weakness and facial droop


Endocrine:  Denies polyuria or polydipsia 


Lymphatic:  Denies swollen glands 


Psychiatric:  Denies depression or anxiety





Heart Score:


Risk Factors:


Risk Factors:  DM, Current or recent (<one month) smoker, HTN, HLP, family 

history of CAD, obesity.


Risk Scores:


Score 0 - 3:  2.5% MACE over next 6 weeks - Discharge Home


Score 4 - 6:  20.3% MACE over next 6 weeks - Admit for Clinical Observation


Score 7 - 10:  72.7% MACE over next 6 weeks - Early Invasive Strategies





Physical Exam:


PE:





Constitutional: Well developed, well nourished, no acute distress, non-toxic 

appearance. []


HENT: Normocephalic, atraumatic, bilateral external ears normal, oropharynx 

moist, no oral exudates, nose normal. []


Eyes: PERRLA, EOMI, conjunctiva normal, no discharge. [] 


Neck: Normal range of motion, no tenderness, supple, no stridor. [] 


Cardiovascular: Heart rate regular rhythm, no murmur []


Lungs & Thorax:  Bilateral breath sounds clear to auscultation []


Abdomen: Bowel sounds normal, soft, no tenderness, no masses, no pulsatile 

masses. [] 


Skin: Warm, dry, no erythema, no rash. [] 


Back: No tenderness, no CVA tenderness. [] 


Extremities: No tenderness, no cyanosis, no clubbing, ROM intact, no edema. [] 


Neurologic: Alert and oriented X 3.  See NIHSS []


Psychologic: Affect normal, judgement normal, mood normal. []





Current Patient Data:


Labs:





                                Laboratory Tests








Test


 9/1/20


03:20


 


White Blood Count


 10.0 x10^3/uL


(4.0-11.0)


 


Red Blood Count


 5.37 x10^6/uL


(4.30-5.70)


 


Hemoglobin


 15.1 g/dL


(13.0-17.5)


 


Hematocrit


 44.8 %


(39.0-53.0)


 


Mean Corpuscular Volume


 84 fL ()





 


Mean Corpuscular Hemoglobin 28 pg (25-35)  


 


Mean Corpuscular Hemoglobin


Concent 34 g/dL


(31-37)


 


Red Cell Distribution Width


 14.3 %


(11.5-14.5)


 


Platelet Count


 252 x10^3/uL


(140-400)


 


Neutrophils (%) (Auto) 61 % (31-73)  


 


Lymphocytes (%) (Auto) 28 % (24-48)  


 


Monocytes (%) (Auto) 9 % (0-9)  


 


Eosinophils (%) (Auto) 2 % (0-3)  


 


Basophils (%) (Auto) 1 % (0-3)  


 


Neutrophils # (Auto)


 6.1 x10^3uL


(1.8-7.7)


 


Lymphocytes # (Auto)


 2.8 x10^3/uL


(1.0-4.8)


 


Monocytes # (Auto)


 0.9 x10^3/uL


(0.0-1.1)


 


Eosinophils # (Auto)


 0.2 x10^3/uL


(0.0-0.7)


 


Basophils # (Auto)


 0.1 x10^3/uL


(0.0-0.2)


 


Prothrombin Time


 9.8 SEC


(9.4-11.4)


 


Prothrombin Time INR 0.9 (0.9-1.1)  


 


Activated Partial


Thromboplast Time 23 SEC (23-33)














EKG:


EKG:


Sinus rhythm, rate 98, normal axis, no ST elevation or depression.  []





Radiology/Procedures:


Radiology/Procedures:


[]


Impressions:


INDICATION: Reason: CODE STROKE / Spl. Instructions:  / History: 


 


COMPARISON: August 16, 2020


 


TECHNIQUE: 


 


Axial CT images obtained through the head without intravenous contrast.


 


One or more of the following individualized dose reduction techniques were


utilized for this examination:  1. Automated exposure control;  2. 


Adjustment of the mA and/or kV according to patient size;  3. Use of 


iterative reconstruction technique.


 


FINDINGS:


 


No intracranial hemorrhage.


No midline shift.  Basal cisterns patent.


Ventricles and sulci are unremarkable.


No acute osseous abnormality.


Orbits and paranasal sinuses unremarkable.


 


IMPRESSION:


 


*   No acute intracranial hemorrhage. Report called to the ER at 3:50 AM.


 


*  There is some fluid in the left external auditory canal and middle ear.


Would correlate with symptoms in the region to ensure that there is not a 


otitis externa and otitis media. Some of the adjacent mastoid air cells 


are also opacified which could be from congestion or mastoiditis.


 


Electronically signed by: Addie Moody MD (9/1/2020 3:56 AM) 


[id number]














DICTATED AND SIGNED BY:     ADDIE MODOY MD


DATE:     09/01/20 0356





CC: DARLEEN GONZALES DO; PCP,NO ~














INDICATION: Reason: CODE STROKE, OMNI 350, 75ml / Spl. Instructions:  / 


History: 


 


COMPARISON: None.


 


TECHNIQUE:


 


Axial CT images obtained through the head and neck arterial vasculature 


with intravenous contrast.  3D images were processed per protocol.  


 


 


Estimates of carotid stenosis based on criteria that correlates with 


NASCET.


 


One or more of the following individualized dose reduction techniques were


utilized for this examination:  1. Automated exposure control;  2. 


Adjustment of the mA and/or kV according to patient size;  3. Use of 


iterative reconstruction technique.


 


FINDINGS:


 


 


 


Neck Angio:


 


The vertebral arteries are patent.


The common carotid arteries and internal carotid arteries are patent.


Right vertebral artery is small in caliber.


 


Brain Angio:


 


Proximal anterior cerebral arteries are patent.


Proximal posterior cerebral arteries are patent. Not well seen distally 


secondary to small size.


Proximal middle cerebral arteries are patent.


Basilar artery is patent.


 


 


Other Findings:


 


There is some bowing of the nasal septum.


Mucosal thickening right maxillary sinus. Odontogenic disease.


Degenerative changes the spine. More than typically seen for the patient's


age with neural foraminal stenosis identified. Scoliotic curvature.


Prominent size of the pharyngeal tonsils. Also some prominence of adenoid 


tonsils. Osseous fusion of C1 to the occipital condyle with chronic 


appearing deformity of C2. There is also osseous fusion of C2 and C3 


vertebral bodies and facet joints.


 


IMPRESSION:


 


*  Vertebral, internal and common carotid arteries are patent within the 


neck.


 


*  Proximal middle, anterior and posterior cerebral arteries are patent.  


If there is high concern for stroke MRI could better assess for a more 


peripheral region of ischemia that would not be well seen on CT.


 


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 


with osseous fusion. There is also some degenerative changes which is more


than typically seen for the patient's age.


 


*  Prominence of the pharyngeal and adenoid tonsils. Could be from 


hyperplasia unless the patient is having symptoms of tonsillitis.


 


*  Odontogenic disease.


 


*  Report called to the ER at 4:20 AM


 


**********FOR INTERNAL CODING PURPOSES**********


 


 


 


 


RESULT CODE: (C)  


 


 


 


Electronically signed by: Addie Moody MD (9/1/2020 4:22 AM) 


[id number]














DICTATED AND SIGNED BY:     ADDIE MOODY MD


DATE:     09/01/20 0422





CC: DARLEEN GONZALES DO; [facility]; PCP,NO ~





Course & Med Decision Making:


Course & Med Decision Making


Pertinent Labs and Imaging studies reviewed. (See chart for details)


The patient is right at the 3-hour window for TPA, but within the 4.5 hour 

window.  He has significant deficits with an NIH of 10.  I will discuss TPA with

 the patient.  I explained the benefits and risks with the patient.  He stated 

verbal understanding.  He has given me verbal permission for TPA administration.

  I have ordered alteplase.  The CT angiogram of the head and neck is not shown 

any major vessel occlusion.  See official report for more details.  I will 

transfer the patient to [facility].  I spoke with Dr. Jara and he

 has accepted the patient for transfer and admission.  He will go to the ICU.





57 minutes of critical care time was spent on this patient exclusive of other 

billable procedures.


[]





Dragon Disclaimer:


Dragon Disclaimer:


This electronic medical record was generated, in whole or in part, using a voice

 recognition dictation system.


NIH Stroke Scale:  








NIH Stroke Scale Response (Comments) Value


 


Level of Consciousness:                 0 Alert/Responsive 0


 


LOC Questions:                          0 Answers both correctly 0


 


LOC Commands:                           0 Performs both tasks 0


 


Best Gaze:                              0 Normal 0


 


Visual:                                 0 No visual loss 0


 


Facial Palsy:                           1 Minor paralysis 1


 


Motor - Left Arm                        4 No movement 4


 


Motor - Right Arm                       0 No drift 0


 


Motor - Left Leg                        4 No movement 4


 


Motor: Right Leg                        0 No drift 0


 


Limb Ataxia:                            0 Absent 0


 


Sensory:                                1 Mid to moderate loss 1


 


Best Language:                          0 Normal 0


 


Dysathria:                              0 Normal 0


 


Extinction and Inattention:             0 Normal 0


 


Total  10











Departure


Departure:


Impression:  


   Primary Impression:  


   Left-sided weakness


   Additional Impression:  


   Facial droop


Disposition:  02 XFER SHT-TRM HOSP


Condition:  STABLE


Referrals:  


PCP,NO (PCP)





Justification of Admission:


Justification of Admission:


Justification of Admission Dx:  Yes


Stroke - Ischemic:  Stroke-Ischemic











DARLEEN GONZALES DO                  Sep 1, 2020 04:07

## 2020-09-01 NOTE — RAD
INDICATION: Reason: CODE STROKE, WEAKNESS / Spl. Instructions:  / History:

 

 

COMPARISON: January 1, 2020

 

FINDINGS:

 

Single view of chest obtained.

Enlarged cardiomediastinal silhouette with hypoexpanded exam the lungs. 

There is some mild interstitial prominence bilaterally as well as 

groundglass opacity

 

 

IMPRESSION:

 

*  Hypoexpanded exam with mild interstitial and groundglass opacity. This 

could be secondary to crowding of the lung markings from hypoexpansion 

although mild edema or interstitial infiltrate is not excluded given this 

finding.

 

Electronically signed by: Herbert Moody MD (9/1/2020 4:49 AM) 

[id number]

## 2020-09-01 NOTE — CARD
MR#: [id number]

Account#: [id number]

Accession#: [id number]

Date of Study: 09/01/2020

Ordering Physician: MARISELA BROWN, 

Referring Physician: MARISELA BROWN, 

Tech: Marcelino Michel





--------------- APPROVED REPORT --------------





EXAM: Two-dimensional and M-mode echocardiogram with Doppler and color Doppler.



Other Information 

Quality : AverageHR: 56bpm



INDICATION

CVA/TIA 



2D DIMENSIONS 

RVDd2.4 (2.9-3.5cm)Left Atrium(2D)3.1 (1.6-4.0cm)

IVSd1.1 (0.7-1.1cm)Aortic Root(2D)3.1 (2.0-3.7cm)

LVDd4.4 (3.9-5.9cm)LVOT Diameter2.1 (1.8-2.4cm)

PWd1.0 (0.7-1.1cm)LVDs2.9 (2.5-4.0cm)

FS (%) 34.6 %SV56.8 ml



Aortic Valve

AoV Peak Donte.110.3cm/sAoV VTI22.9cm

AO Peak GR.4.9mmHgLVOT  VTI 17.52cm

AO Mean GR.3mmHg



Mitral Valve

MV E Rtliggpl68.8cm/sMV E Peak Gr.3mmHg

MV DECEL [id number] A Cxpqisyh32.0cm/s

MV E Mean Gr.1mmHgE/A  Ratio2.0



TDI

Lateral E' P. V11.33cm/sMedial E' P. V10.30cm/s

E/Lateral E'7.7E/Medial E'8.4



Tricuspid Valve

TR P. Bghaakkz647dg/sRAP OFSDDIXR6rdKy

TR Peak Gr.32juEbWDNY82rmXi



Pulmonary Vein

S1 Xrasshfm89.8cm/sS2 Gzauqyis48.59cm/s

D2 Dljtwjbb25.6cm/sPVa atezxzcb55vaak



 LEFT VENTRICLE 

The left ventricle is normal size. There is borderline concentric left ventricular hypertrophy. The l
eft ventricular systolic function is low normal. The Ejection Fraction is estimated at 50%. Septal mo
tion consistent with conduction abnormality. The left ventricular diastolic function and filling is n
ormal for age.



 RIGHT VENTRICLE 

The right ventricle is normal size. There is normal right ventricular wall thickness. The right ventr
icular systolic function is normal.



 ATRIA 

The left atrium size is normal. The right atrium size is normal. The interatrial septum is intact wit
h no evidence for an atrial septal defect or patent foramen ovale as noted on 2-D or Doppler imaging.




 AORTIC VALVE 

The aortic valve is normal in structure and function. Doppler and Color Flow revealed no significant 
aortic regurgitation. There is no significant aortic valvular stenosis. Calculated aortic valve area 
is 2.58 cm2 with maximum pressure gradient of 6 mmHg and mean pressure gradient of 3 mmHg.



 MITRAL VALVE 

The mitral valve is normal in structure and function. There is no evidence of mitral valve prolapse. 
There is no mitral valve stenosis. Doppler and Color Flow revealed no mitral valve regurgitation note
d.



 TRICUSPID VALVE 

The tricuspid valve is normal in structure and function. Doppler and Color Flow revealed trace tricus
pid regurgitation with an estimated PAP of 21 mmHg. There is no tricuspid valve stenosis.



 PULMONIC VALVE 

The pulmonic valve is not well visualized. Doppler and Color Flow revealed trace pulmonic valvular re
gurgitation.



 GREAT VESSELS 

The aortic root is normal in size. The IVC was not well visualized.



 PERICARDIAL EFFUSION 

There is no evidence of significant pericardial effusion.



Critical Notification

Critical Value: No



<Conclusion>

The left ventricle is normal size.

The left ventricular systolic function is low normal.

The Ejection Fraction is estimated at 50%.

Septal motion consistent with conduction abnormality.

There is borderline concentric left ventricular hypertrophy.

Doppler and Color Flow revealed no significant aortic regurgitation.

There is no significant aortic valvular stenosis.

Doppler and Color Flow revealed no mitral valve regurgitation noted.

Doppler and Color Flow revealed trace tricuspid regurgitation with an estimated PAP of 21 mmHg.



Signed by : Gerald Schuster MD

Electronically Approved : 09/01/2020 14:43:05

## 2020-09-01 NOTE — NUR
SS following for discharge planning. SS reviewed pt chart and discussed with pt RN. Pt is 
from home and is currently on room air. COVID19 negative. Pt had new CVA. Cardiology 
following. SS will continue to follow for discharge planning.

## 2020-09-02 VITALS — DIASTOLIC BLOOD PRESSURE: 55 MMHG | SYSTOLIC BLOOD PRESSURE: 94 MMHG

## 2020-09-02 VITALS — DIASTOLIC BLOOD PRESSURE: 67 MMHG | SYSTOLIC BLOOD PRESSURE: 153 MMHG

## 2020-09-02 VITALS — DIASTOLIC BLOOD PRESSURE: 69 MMHG | SYSTOLIC BLOOD PRESSURE: 106 MMHG

## 2020-09-02 VITALS — DIASTOLIC BLOOD PRESSURE: 62 MMHG | SYSTOLIC BLOOD PRESSURE: 116 MMHG

## 2020-09-02 VITALS — SYSTOLIC BLOOD PRESSURE: 140 MMHG | DIASTOLIC BLOOD PRESSURE: 73 MMHG

## 2020-09-02 VITALS — DIASTOLIC BLOOD PRESSURE: 90 MMHG | SYSTOLIC BLOOD PRESSURE: 142 MMHG

## 2020-09-02 VITALS — DIASTOLIC BLOOD PRESSURE: 67 MMHG | SYSTOLIC BLOOD PRESSURE: 136 MMHG

## 2020-09-02 VITALS — SYSTOLIC BLOOD PRESSURE: 114 MMHG | DIASTOLIC BLOOD PRESSURE: 76 MMHG

## 2020-09-02 VITALS — SYSTOLIC BLOOD PRESSURE: 103 MMHG | DIASTOLIC BLOOD PRESSURE: 64 MMHG

## 2020-09-02 VITALS — SYSTOLIC BLOOD PRESSURE: 104 MMHG | DIASTOLIC BLOOD PRESSURE: 63 MMHG

## 2020-09-02 VITALS — SYSTOLIC BLOOD PRESSURE: 105 MMHG | DIASTOLIC BLOOD PRESSURE: 51 MMHG

## 2020-09-02 VITALS — SYSTOLIC BLOOD PRESSURE: 133 MMHG | DIASTOLIC BLOOD PRESSURE: 85 MMHG

## 2020-09-02 VITALS — DIASTOLIC BLOOD PRESSURE: 63 MMHG | SYSTOLIC BLOOD PRESSURE: 111 MMHG

## 2020-09-02 VITALS — SYSTOLIC BLOOD PRESSURE: 129 MMHG | DIASTOLIC BLOOD PRESSURE: 79 MMHG

## 2020-09-02 VITALS — SYSTOLIC BLOOD PRESSURE: 119 MMHG | DIASTOLIC BLOOD PRESSURE: 62 MMHG

## 2020-09-02 VITALS — DIASTOLIC BLOOD PRESSURE: 75 MMHG | SYSTOLIC BLOOD PRESSURE: 142 MMHG

## 2020-09-02 PROCEDURE — B24BZZ4 ULTRASONOGRAPHY OF HEART WITH AORTA, TRANSESOPHAGEAL: ICD-10-PCS | Performed by: INTERNAL MEDICINE

## 2020-09-02 NOTE — NUR
Patient came back to the unit at 1230, awake, alert, oriented x 4, on O2 at 2lpm. VS stable, 
patient's O2 sat 97% on room air. Call light placed within reach, we will continue to 
monitor.

## 2020-09-02 NOTE — NUR
SS following up with discharge planning. SS reviewed pt chart and discussed with pt RN. Pt 
is currently on room air. PT/OT evaluated and recommended home independent. Pt having TTE 
and Brain MRI today. Discharge plan is to home when medically ready. SS will continue to 
follow for discharge planning.

## 2020-09-02 NOTE — PDOC
PROGRESS NOTES


Date of Service


DATE: 9/2/20 


TIME: 10:13


Assessment


Stroke symptoms in the young, symptoms have resolved after alteplase, no obvious

 risk factors. Carotid or vertebral dissection would have been an attractive 

explanation, but none is present.


Now patient has a migraine headache, which has already gone away, this could all

be migraine related


Cervical fusion


Possible left external otitis, no symptoms


Hyperlipidemia


Plan


ICU observation


Transthoracic echocardiogram


MRI brain,


Aspirin


Statin


Fioricet


Aiming for discharge later today


Cardiology to consider possible LINQ  device.


Follow-up with me in 4-6 weeks.


Subjective


He had a headache last night with throbbing pain and photophonophobia


Objective





Vital Signs








  Date Time  Temp Pulse Resp B/P (MAP) Pulse Ox O2 Delivery O2 Flow Rate FiO2


 


9/2/20 10:04  69 17 114/76 (89) 98 Room Air  


 


9/2/20 08:00 97.8       





 97.8       


 


9/1/20 07:30       98.0 














Intake and Output 


 


 9/2/20





 07:00


 


Intake Total 130 ml


 


Output Total 800 ml


 


Balance -670 ml


 


 


 


Intake Oral 130 ml


 


Output Urine Total 800 ml


 


# Voids 1








PHYSICAL EXAM


Alert. Oriented to time, place and person.


PERRL.


EOMI.


CN: no focal findings.


Muscle tone: normal.


Muscle strength: 5/5


DTR: 2+


Plantar reflex: flexor


Gait: not examined in bed.


Sensory exam: no abnormal findings.


No cerebellar signs elicited.


Review of Relevant


I have reviewed the following items seema (where applicable) has been applied.


Labs





Laboratory Tests








Test


 9/1/20


09:30 9/1/20


09:50 9/1/20


17:53


 


Coronavirus (PCR)


 Not detected


(Not Detected) 


 





 


SARS-CoV-2 Antigen (Rapid)


 Negative


(NEGATIVE) 


 





 


Triglycerides Level


 


 113 mg/dL


(0-150) 





 


Cholesterol Level


 


 174 mg/dL


(0-200) 





 


LDL Cholesterol, Calculated


 


 122 mg/dL


(0-100) 





 


VLDL Cholesterol, Calculated


 


 23 mg/dL


(0-40) 





 


Non-HDL Cholesterol Calculated


 


 145 mg/dL


(0-129) 





 


HDL Cholesterol


 


 29 mg/dL


(40-60) 





 


Cholesterol/HDL Ratio  6.0  


 


Urine Opiates Screen   Neg (NEG) 


 


Urine Methadone Screen   Neg (NEG) 


 


Urine Barbiturates   Neg (NEG) 


 


Urine Phencyclidine Screen   Neg (NEG) 


 


Urine


Amphetamine/Methamphetamine 


 


 Neg (NEG) 





 


Urine Benzodiazepines Screen   Neg (NEG) 


 


Urine Cocaine Screen   Neg (NEG) 


 


Urine Cannabinoids Screen   Neg (NEG) 


 


Urine Ethyl Alcohol   Neg (NEG) 








Laboratory Tests








Test


 9/1/20


17:53


 


Urine Opiates Screen Neg (NEG) 


 


Urine Methadone Screen Neg (NEG) 


 


Urine Barbiturates Neg (NEG) 


 


Urine Phencyclidine Screen Neg (NEG) 


 


Urine


Amphetamine/Methamphetamine Neg (NEG) 





 


Urine Benzodiazepines Screen Neg (NEG) 


 


Urine Cocaine Screen Neg (NEG) 


 


Urine Cannabinoids Screen Neg (NEG) 


 


Urine Ethyl Alcohol Neg (NEG) 








Medications





Current Medications


Labetalol HCl (Normodyne Iv Push) 10 mg PRN Q10MIN  PRN IVP HYPERTENSION;  Start

9/1/20 at 08:30


Nicardipine HCl 50 mg/Sodium Chloride 250 ml @  25 mls/hr CONT PRN  PRN IV 

ELEVATED BP, SEE COMMENTS;  Start 9/1/20 at 08:30


Acetaminophen (Tylenol) 650 mg PRN Q6HRS  PRN PO MILD PAIN / TEMP > 100.3'F Last

administered on 9/2/20at 02:12;  Start 9/1/20 at 08:30


Acetaminophen (Tylenol Supp) 325 mg PRN Q6HRS  PRN AK MILD PAIN / TEMP > 

100.3'F;  Start 9/1/20 at 08:30


Ondansetron HCl (Zofran) 4 mg PRN Q6HRS  PRN IV NAUSEA/VOMITING Last 

administered on 9/2/20at 02:16;  Start 9/1/20 at 08:30


Atorvastatin Calcium (Lipitor) 10 mg QHS PO  Last administered on 9/1/20at 

20:11;  Start 9/1/20 at 21:00


Ringer's Solution 1,000 ml @  50 mls/hr Q20H IV ;  Start 9/2/20 at 07:00;  Stop 

9/2/20 at 18:59


Acetaminophen/ Butalbital/ Caffeine (Fioricet) 1 tab PRN Q6HRS  PRN PO MIGRAINE 

HEADACHE;  Start 9/2/20 at 08:45


Aspirin (Ecotrin) 81 mg DAILYWBKFT PO ;  Start 9/2/20 at 09:00


Benzocaine (Hurricaine One) 2 spray 1X  ONCE MM ;  Start 9/2/20 at 10:15;  Stop 

9/2/20 at 10:16


Lidocaine HCl (Viscous Lidocaine) 15 ml 1X  ONCE SWSW ;  Start 9/2/20 at 09:45; 

Stop 9/2/20 at 09:46;  Status DC


Lidocaine HCl (Glydo (Lidocaine) Jelly) 1 kimo 1X  ONCE MM ;  Start 9/2/20 at 

09:45;  Stop 9/2/20 at 09:46;  Status DC


Lidocaine HCl (Xylocaine 2% Topical 30gm Tube) 30 kimo STK-MED ONCE TP ;  Start 

9/2/20 at 10:00;  Stop 9/2/20 at 10:01;  Status DC





Active Scripts


Active


Reported


No Known Medications Prior To Admisstion (Info)  Each 1 Each  1X


Vitals/I & O





Vital Sign - Last 24 Hours








 9/1/20 9/1/20 9/1/20 9/1/20





 10:32 11:02 11:32 12:07


 


Temp  98.0  





  98.0  


 


Pulse 64 85 71 


 


Resp 16 18 14 


 


B/P (MAP) 116/64 (81) 118/75 (89) 100/49 (66) 


 


Pulse Ox 97 99 98 


 


O2 Delivery Room Air Room Air Room Air Room Air


 


    





    





 9/1/20 9/1/20 9/1/20 9/1/20





 12:09 13:03 13:08 14:06


 


Pulse 63 71 63 64


 


Resp 13 23  16


 


B/P (MAP) 96/50 (65) 85/45 (58) 104/63 (77) 106/59 (75)


 


Pulse Ox 98 96  99


 


O2 Delivery Room Air Room Air  Room Air





 9/1/20 9/1/20 9/1/20 9/1/20





 15:05 16:00 16:00 17:05


 


Temp 98.1   





 98.1   


 


Pulse 68  66 65


 


Resp 14  16 14


 


B/P (MAP) 97/56 (70)  94/52 (66) 87/51 (63)


 


Pulse Ox 100  98 98


 


O2 Delivery Room Air Room Air Room Air 


 


    





    





 9/1/20 9/1/20 9/1/20 9/1/20





 18:01 19:17 19:24 19:31


 


Temp   98.1 





   98.1 


 


Pulse 83 81  


 


Resp 21 14  


 


B/P (MAP) 102/65 (77) 120/78 (92)  


 


Pulse Ox 97 98  


 


O2 Delivery Room Air Room Air  Room Air


 


    





    





 9/1/20 9/1/20 9/1/20 9/2/20





 21:11 22:08 23:06 00:05


 


Pulse 75 63 72 


 


Resp 16 16 16 


 


B/P (MAP) 111/66 (81) 117/67 (84) 104/66 (79) 


 


Pulse Ox 96 97 98 


 


O2 Delivery Room Air Room Air Room Air Room Air





 9/2/20 9/2/20 9/2/20 9/2/20





 00:10 01:01 01:57 03:07


 


Temp 98.2   





 98.2   


 


Pulse 98 109 94 87


 


Resp 20 20 20 20


 


B/P (MAP) 129/79 (96) 142/75 (97) 153/67 (95) 119/62 (81)


 


Pulse Ox 98 97 97 95


 


O2 Delivery Room Air Room Air Room Air Room Air


 


    





    





 9/2/20 9/2/20 9/2/20 9/2/20





 03:57 03:59 04:58 06:11


 


Pulse  74 58 60


 


Resp  16 16 16


 


B/P (MAP)  116/62 (80) 105/51 (69) 94/55 (68)


 


Pulse Ox  97 95 95


 


O2 Delivery Room Air Room Air Room Air Room Air





 9/2/20 9/2/20 9/2/20 9/2/20





 07:00 08:00 08:00 09:00


 


Temp   97.8 





   97.8 


 


Pulse 64  63 65


 


Resp 15  15 16


 


B/P (MAP) 106/69 (81)  104/63 (77) 103/64 (77)


 


Pulse Ox 96  96 94


 


O2 Delivery Room Air Room Air Room Air 


 


    





    





 9/2/20   





 10:04   


 


Pulse 69   


 


Resp 17   


 


B/P (MAP) 114/76 (89)   


 


Pulse Ox 98   


 


O2 Delivery Room Air   














Intake and Output   


 


 9/1/20 9/1/20 9/2/20





 15:00 23:00 07:00


 


Intake Total 100 ml 30 ml 0 ml


 


Output Total 400 ml 400 ml 0 ml


 


Balance -300 ml -370 ml 0 ml











Justicifation of Admission Dx:


Justifications for Admission:


Justification of Admission Dx:  N/A











MARISELA BROWN MD            Sep 2, 2020 10:16

## 2020-09-02 NOTE — NUR
Slept after Tylenol and Zofran given. Does not like to use urinal. Unable to measure urine 
output. Used toilet at bedside to void.

## 2020-09-02 NOTE — PN
DATE:  09/02/2020



SUBJECTIVE:  The patient is resting flat in bed, in no apparent distress, awake,

alert.  On questioning him, he denied any complaint, in particular, his symptom

has completely resolved and he apparently had a good night sleep.  His serology

for coronavirus was not detected and he as had a transthoracic echocardiogram,

which showed that his left ventricle is normal in size.  Left ventricular

systolic function is low normal and ejection fraction estimated at 50%, septal

motion consistent with conduction abnormality.  There is borderline concentric

left ventricular hypertrophy.  Doppler and color flow revealed no significant

aortic regurgitation, no significant aortic valvular stenosis, mild, revealed no

mitral valve regurgitation, trace tricuspid regurgitation, estimated pulmonary

artery pressure of 21 mmHg.  The patient is scheduled for MRI of the brain

together with transesophageal echocardiogram.



PHYSICAL EXAMINATION:

GENERAL:  When I saw him this morning, he looked well and was clearly in no

apparent respiratory distress.  No pallor, jaundice, cyanosis or thyromegaly. 

No jugular venous distention.  No lower limb edema.

VITAL SIGNS:  His heart rate was 65, blood pressure was 103/64, temperature

97.8, respiratory rate was 15 and oxygen saturation was 96%.



The rest of clinical exam is stable.



PLAN:  To await for the MRI of the brain as well as the transesophageal

echocardiogram.  We will continue him on aspirin and atorvastatin and if all is

well, the patient can be discharged home.

 



______________________________

ANITA STEIN MD



DR:  BRISEYDA/rashad  JOB#:  [id number] / [id number]

DD:  09/02/2020 09:59  DT:  09/02/2020 10:16

## 2020-09-02 NOTE — NUR
Has been arguing with significant other on cell phone. Now complains of Headache and Nausea. 
Neuro check WNL. VSS. Afebrile. Gave Tylenol 650 po and Zofran 4mg IVP at this time. Advise 
to try to get some sleep. Sitting up in bed. Call light at hand.

## 2020-09-02 NOTE — NUR
Discharge Note:



[facility] NICHOL[facility]



Discharge instructions and discharge home medications reviewed with patient and a copy 
given. All questions have been answered and understanding verbalized. 



The following instructions and handouts were given:

Take home meds as directed.

Stroke education packet, ASD

Follow up with neurology in 4-6 weeks.

Follow up with cardiology, call for appointment

Follow up with PCP in a week.

Watch out for symptoms of stroke (sudden weakness, numbness, slurred speech, severe 
headache. 



Discontinued lines and drains: peripheral IV intact, patient tolerated removal, no 
complications noted.



Patient discharged to home with self-care via wheelchair accompanied by the patient's  
friend at 1730.

## 2020-09-02 NOTE — RAD
INDICATION: Reason: CODE STROKE, OMNI 350, 75ml / Spl. Instructions:  / 

History: 

 

COMPARISON: None.

 

TECHNIQUE:

 

Axial CT images obtained through the head and neck arterial vasculature 

with intravenous contrast.  3D images were processed per protocol.  

 

 

Estimates of carotid stenosis based on criteria that correlates with 

NASCET.

 

One or more of the following individualized dose reduction techniques were

utilized for this examination:  1. Automated exposure control;  2. 

Adjustment of the mA and/or kV according to patient size;  3. Use of 

iterative reconstruction technique.

 

FINDINGS:

 

 

 

Neck Angio:

 

The vertebral arteries are patent.

The common carotid arteries and internal carotid arteries are patent.

Right vertebral artery is small in caliber.

 

Brain Angio:

 

Proximal anterior cerebral arteries are patent.

Proximal posterior cerebral arteries are patent. Not well seen distally 

secondary to small size.

Proximal middle cerebral arteries are patent.

Basilar artery is patent.

 

 

Other Findings:

 

There is some bowing of the nasal septum.

Mucosal thickening right maxillary sinus. Odontogenic disease.

Degenerative changes the spine. More than typically seen for the patient's

age with neural foraminal stenosis identified. Scoliotic curvature.

Prominent size of the pharyngeal tonsils. Also some prominence of adenoid 

tonsils. Osseous fusion of C1 to the occipital condyle with chronic 

appearing deformity of C2. There is also osseous fusion of C2 and C3 

vertebral bodies and facet joints.

 

IMPRESSION:

 

*  Vertebral, internal and common carotid arteries are patent within the 

neck.

 

*  Proximal middle, anterior and posterior cerebral arteries are patent.  

If there is high concern for stroke MRI could better assess for a more 

peripheral region of ischemia that would not be well seen on CT.

 

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 

with osseous fusion. There is also some degenerative changes which is more

than typically seen for the patient's age.

 

*  Prominence of the pharyngeal and adenoid tonsils. Could be from 

hyperplasia unless the patient is having symptoms of tonsillitis.

 

*  Odontogenic disease.

 

*  Report called to the ER at 4:20 AM

 

**********FOR INTERNAL CODING PURPOSES**********

 

 

 

 

RESULT CODE: (C)  

MTDD

## 2020-09-02 NOTE — NUR
Fiorecet prescription called to [facility] in [place] at 1830.

## 2020-09-02 NOTE — RAD
BRAIN W/O CONTRAST 

 

Date: 9/2/2020 8:29 AM 

 

Indication:  Stroke with Alteplase  

 

Comparison:  CT 9/1/2020. 

 

Technique: Multiplanar multisequence MRI of the brain was performed 

without intravenous contrast using the standard protocol.

 

Findings: 

 

No acute infarct. No acute or chronic hemorrhage. The ventricles are 

normal in size and configuration without hydrocephalus.

 

The scalp and calvarium are normal. The pituitary and sella are normal. No

Chiari malformation. The visualized upper cervical spine demonstrates 

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

 

The visualized orbits and globes are normal. Mild right maxillary sinus 

mucosal thickening. Trace left mastoid fluid.

 

Normal flow voids within the vertebral, basilar, and internal carotid 

arteries indicating patency.

 

IMPRESSION:

 

No acute infarct, hemorrhage, mass, or hydrocephalus..

 

Electronically signed by: Oh Rivera MD (9/2/2020 1:02 PM) [facility]

## 2020-09-03 LAB
AT III ACT/NOR PPP CHRO: (no result) %
CARDIOLIPIN ANTIBODIES: (no result)
PROT C PPP-ACNC: (no result) [IU]/ML
PROT S ACT/NOR PPP: (no result) %
SCREEN APTT: (no result) S

## 2020-09-03 NOTE — DS
DATE OF DISCHARGE:  09/02/2020



HOSPITAL COURSE:  The patient is a 30-year-old  male patient who

presented to the Emergency Room of [facility] with left-sided weakness

including facial droop that started around 1:00 a.m. in the morning of

admission.  He apparently had an argument with his significant other over the

phone.  He tried to stand up when collapsed, felt numbness and weakness of the

entire left side.  He presented to [facility] Emergency Department where Dr. Bradley determined that the patient was in the 4.5 hour window for alteplase and

administered it.  The patient had complete resolution of the symptoms.  He

states he feels very tired and was from there, he was transferred to [facility] according to the protocol.  He was seen by Dr. Ulloa.  He has

had a transthoracic echocardiogram, which showed that the patient has the left

ventricle is normal in size.  The left ventricular systolic function is low

normal, ejection fraction estimated at 50%, septal motion consistent with

conduction abnormalities, a borderline concentric left ventricular hypertrophy. 

There is no significant aortic stenosis or regurg, no mitral valve

regurgitation.  There is trace tricuspid regurgitation, estimated pulmonary

artery pressure of 21 mmHg.  He underwent a transthoracic echocardiogram that

was also unrevealing and had had an MRI of the brain, which basically showed no

acute infarct, no acute or chronic hemorrhage.  The ventricles are normal in

size and configuration without hydrocephalus.  The scalp and calvarium are

normal.  Pituitary and sella are normal.  No Chiari malformation.  The

visualized upper cervical spine demonstrates an atlanto-occipital assimilation

and partial congenital fusion of C3-C4.  The visualized orbits and globes are

normal.  Mild right maxillary sinus mucosal thickening.  Trace left mastoid

fluid.  The patient remained stable, has had no further neurological deficit.  A

decision was made to discharge him home to continue with aspirin and Lipitor.



PHYSICAL EXAMINATION:

GENERAL:  Today, he looked well and was clearly in no apparent respiratory

distress.  No pallor, jaundice, cyanosis or thyromegaly.  No jugular venous

distention.  No lower limb edema.

VITAL SIGNS:  His heart rate was 90, blood pressure was 136/67, temperature was

98.3, respiratory rate was 16, and oxygen saturation was 97%.

HEAD, EYES, EARS, NOSE AND THROAT:  Showed normocephalic, atraumatic.

NECK:  Supple.

HEART:  Showed normal first and second heart sounds.  No gallop, rub or murmur.

CHEST:  Clear to auscultation.  No crepitation or rhonchi.

ABDOMEN:  Distended, soft, nontender.  No guarding or rigidity.  No

organomegaly.  All hernial orifices intact.  Bowel sounds normal.

NEUROLOGIC:  He was awake, oriented.  All his cranial nerves are intact.

EXTREMITIES:  He moves extremities without difficulty.



ASSESSMENT AND PLAN:  Stroke symptoms and all his symptoms have resolved.  The

patient has had transthoracic and transesophageal, both were unrevealing.  MRI

showed no pathological abnormalities.  The patient does have hyperlipidemia,

possible external otitis, but no symptoms or cervical fusion.  Plan is to follow

with his primary care physician.  The patient was discharged on Lipitor as well

as aspirin as recommended by Dr. Ulloa.

 



______________________________

ANITA STEIN MD



DR:  BRISEYDA/rashad  JOB#:  [id number] / [id number]

DD:  09/03/2020 08:14  DT:  09/03/2020 09:07

## 2020-09-03 NOTE — CARD
MR#: [id number]

Account#: [id number]

Accession#: [id number]

Date of Study: 09/02/2020

Ordering Physician: TONY WASHINGTON, 

Referring Physician: TONY WASHINGTON, 

Tech: Melissa Michel





--------------- APPROVED REPORT --------------





EXAM: Two-dimensional and M-mode echocardiogram with Doppler and color Doppler.



INDICATION

CVA/TIA 



Echo Enhancing Agent

Indication: Rule Out Septal Defect

Agent/Amount Used: Agitated Saline 20mL



Reason For Test : PFO Closure



PROCEDURE

After obtaining informed consent, patient underwent transesophageal echo in the PACU.

Type of Sedation : General Anesthesia

Sedation was administered by Agustin Reyes. 

Sedation was achieved with Propofol 300mg intravenously. 

Transesophageal probe was inserted and advanced into esophagus by Gerald Schuster MD.

Echo enhancement indication: R/O Septal defect.

Echo enhancement agent administered: Agitated Saline

The JAZMINE was performed without complications. 

Throughout the procedure, the blood pressure, pulse oximetry, cardiac rhythm, and rate were monitored
.

The patient tolerated the procedure without adverse effects. Recovery from general anesthesia was une
ventful and vital signs were stable.



 LEFT VENTRICLE 

The left ventricle is normal size. There is normal left ventricular wall thickness. The left ventricu
lar systolic function is low normal. Left ventricular ejection fraction is 50 to 55%. There is normal
 LV segmental wall motion. No left ventricle thrombus noted on this study.



 RIGHT VENTRICLE 

The right ventricle is normal size. There is normal right ventricular wall thickness. The right ventr
icular systolic function is normal.



 ATRIA 

The left atrium size is normal. The right atrium size is normal. The study is positive for an ASD. Th
ere is left to right shunting present. 



 AORTIC VALVE 

The aortic valve is normal in structure and function. Doppler and Color Flow revealed no significant 
aortic regurgitation. There is no significant aortic valvular stenosis.



 MITRAL VALVE 

The mitral valve is normal in structure and function. There is no evidence of mitral valve prolapse. 
There is no mitral valve stenosis. Doppler and Color Flow revealed no mitral valve regurgitation note
d.



 TRICUSPID VALVE 

The tricuspid valve is normal in structure and function. Doppler and Color Flow revealed trace tricus
pid regurgitation. There is no tricuspid valve stenosis.



 PULMONIC VALVE 

The pulmonary valve is normal in structure and function. Doppler and Color Flow revealed trace pulmon
ic valvular regurgitation.



 GREAT VESSELS 

The aortic root is normal in size.



Critical Notification

Critical Value: No



<Conclusion>

The left ventricle is normal size.

The left ventricular systolic function is low normal.

Left ventricular ejection fraction is 50 to 55%.

There is normal LV segmental wall motion.

The study is positive for an ASD.  There is left to right shunting present.

Doppler and Color Flow revealed no significant aortic regurgitation.

There is no significant aortic valvular stenosis.

Doppler and Color Flow revealed no mitral valve regurgitation noted.

Doppler and Color Flow revealed trace tricuspid regurgitation.

Doppler and Color Flow revealed trace pulmonic valvular regurgitation.



Signed by : Gerald Schuster MD

Electronically Approved : 09/03/2020 10:02:36

## 2021-01-23 ENCOUNTER — HOSPITAL ENCOUNTER (EMERGENCY)
Dept: HOSPITAL 63 - ER | Age: 31
Discharge: HOME | End: 2021-01-23
Payer: SELF-PAY

## 2021-01-23 VITALS — HEIGHT: 65 IN | WEIGHT: 192.24 LBS | BODY MASS INDEX: 32.03 KG/M2

## 2021-01-23 VITALS — SYSTOLIC BLOOD PRESSURE: 131 MMHG | DIASTOLIC BLOOD PRESSURE: 85 MMHG

## 2021-01-23 DIAGNOSIS — R79.89: ICD-10-CM

## 2021-01-23 DIAGNOSIS — Z87.442: ICD-10-CM

## 2021-01-23 DIAGNOSIS — R11.0: ICD-10-CM

## 2021-01-23 DIAGNOSIS — Z88.1: ICD-10-CM

## 2021-01-23 DIAGNOSIS — K21.9: ICD-10-CM

## 2021-01-23 DIAGNOSIS — R59.0: ICD-10-CM

## 2021-01-23 DIAGNOSIS — Z98.890: ICD-10-CM

## 2021-01-23 DIAGNOSIS — Z88.8: ICD-10-CM

## 2021-01-23 DIAGNOSIS — B34.9: ICD-10-CM

## 2021-01-23 DIAGNOSIS — Z88.0: ICD-10-CM

## 2021-01-23 DIAGNOSIS — R10.11: ICD-10-CM

## 2021-01-23 DIAGNOSIS — Z20.822: ICD-10-CM

## 2021-01-23 DIAGNOSIS — R10.13: Primary | ICD-10-CM

## 2021-01-23 LAB
ALBUMIN SERPL-MCNC: 3.9 G/DL (ref 3.4–5)
ALP SERPL-CCNC: 144 U/L (ref 46–116)
ALT SERPL-CCNC: 174 U/L (ref 16–63)
AMPHETAMINE/METHAMPHETAMINE: (no result)
AMYLASE SERPL-CCNC: 43 U/L (ref 25–115)
ANION GAP SERPL CALC-SCNC: 10 MMOL/L (ref 6–14)
APTT PPP: YELLOW S
AST SERPL-CCNC: 48 U/L (ref 15–37)
BACTERIA #/AREA URNS HPF: 0 /HPF
BARBITURATES UR-MCNC: (no result) UG/ML
BASOPHILS # BLD AUTO: 0.1 X10^3/UL (ref 0–0.2)
BASOPHILS NFR BLD: 1 % (ref 0–3)
BENZODIAZ UR-MCNC: (no result) UG/L
BILIRUB DIRECT SERPL-MCNC: 0.1 MG/DL (ref 0–0.2)
BILIRUB SERPL-MCNC: 0.3 MG/DL (ref 0.2–1)
BILIRUB UR QL STRIP: (no result)
CA-I SERPL ISE-MCNC: 10 MG/DL (ref 8–26)
CALCIUM SERPL-MCNC: 9.1 MG/DL (ref 8.5–10.1)
CANNABINOIDS UR-MCNC: (no result) UG/L
CHLORIDE SERPL-SCNC: 104 MMOL/L (ref 98–107)
CO2 SERPL-SCNC: 28 MMOL/L (ref 21–32)
COCAINE UR-MCNC: (no result) NG/ML
CREAT SERPL-MCNC: 0.9 MG/DL (ref 0.7–1.3)
EOSINOPHIL NFR BLD: 0.1 X10^3/UL (ref 0–0.7)
EOSINOPHIL NFR BLD: 1 % (ref 0–3)
ERYTHROCYTE [DISTWIDTH] IN BLOOD BY AUTOMATED COUNT: 14.3 % (ref 11.5–14.5)
FIBRINOGEN PPP-MCNC: CLEAR MG/DL
GFR SERPLBLD BASED ON 1.73 SQ M-ARVRAT: 100.5 ML/MIN
GLUCOSE SERPL-MCNC: 90 MG/DL (ref 70–99)
GLUCOSE UR STRIP-MCNC: (no result) MG/DL
HCT VFR BLD CALC: 47.7 % (ref 39–53)
HGB BLD-MCNC: 15.8 G/DL (ref 13–17.5)
LIPASE: 115 U/L (ref 73–393)
LYMPHOCYTES # BLD: 2.3 X10^3/UL (ref 1–4.8)
LYMPHOCYTES NFR BLD AUTO: 20 % (ref 24–48)
MCH RBC QN AUTO: 28 PG (ref 25–35)
MCHC RBC AUTO-ENTMCNC: 33 G/DL (ref 31–37)
MCV RBC AUTO: 84 FL (ref 79–100)
METHADONE SERPL-MCNC: (no result) NG/ML
MONO #: 0.9 X10^3/UL (ref 0–1.1)
MONOCYTES NFR BLD: 8 % (ref 0–9)
NEUT #: 8.2 X10^3UL (ref 1.8–7.7)
NEUTROPHILS NFR BLD AUTO: 71 % (ref 31–73)
NITRITE UR QL STRIP: (no result)
OPIATES UR-MCNC: (no result) NG/ML
PCP SERPL-MCNC: (no result) MG/DL
PLATELET # BLD AUTO: 250 X10^3/UL (ref 140–400)
POTASSIUM SERPL-SCNC: 3.7 MMOL/L (ref 3.5–5.1)
PROT SERPL-MCNC: 8.5 G/DL (ref 6.4–8.2)
RBC # BLD AUTO: 5.66 X10^6/UL (ref 4.3–5.7)
RBC #/AREA URNS HPF: 0 /HPF (ref 0–2)
SODIUM SERPL-SCNC: 142 MMOL/L (ref 136–145)
SP GR UR STRIP: >=1.03
SQUAMOUS #/AREA URNS LPF: (no result) /LPF
UROBILINOGEN UR-MCNC: 0.2 MG/DL
WBC # BLD AUTO: 11.6 X10^3/UL (ref 4–11)
WBC #/AREA URNS HPF: (no result) /HPF (ref 0–4)

## 2021-01-23 PROCEDURE — 86705 HEP B CORE ANTIBODY IGM: CPT

## 2021-01-23 PROCEDURE — 96375 TX/PRO/DX INJ NEW DRUG ADDON: CPT

## 2021-01-23 PROCEDURE — U0003 INFECTIOUS AGENT DETECTION BY NUCLEIC ACID (DNA OR RNA); SEVERE ACUTE RESPIRATORY SYNDROME CORONAVIRUS 2 (SARS-COV-2) (CORONAVIRUS DISEASE [COVID-19]), AMPLIFIED PROBE TECHNIQUE, MAKING USE OF HIGH THROUGHPUT TECHNOLOGIES AS DESCRIBED BY CMS-2020-01-R: HCPCS

## 2021-01-23 PROCEDURE — 74022 RADEX COMPL AQT ABD SERIES: CPT

## 2021-01-23 PROCEDURE — 80048 BASIC METABOLIC PNL TOTAL CA: CPT

## 2021-01-23 PROCEDURE — 96374 THER/PROPH/DIAG INJ IV PUSH: CPT

## 2021-01-23 PROCEDURE — 96361 HYDRATE IV INFUSION ADD-ON: CPT

## 2021-01-23 PROCEDURE — 87340 HEPATITIS B SURFACE AG IA: CPT

## 2021-01-23 PROCEDURE — 80076 HEPATIC FUNCTION PANEL: CPT

## 2021-01-23 PROCEDURE — C9803 HOPD COVID-19 SPEC COLLECT: HCPCS

## 2021-01-23 PROCEDURE — 85730 THROMBOPLASTIN TIME PARTIAL: CPT

## 2021-01-23 PROCEDURE — 93005 ELECTROCARDIOGRAM TRACING: CPT

## 2021-01-23 PROCEDURE — 85025 COMPLETE CBC W/AUTO DIFF WBC: CPT

## 2021-01-23 PROCEDURE — 74177 CT ABD & PELVIS W/CONTRAST: CPT

## 2021-01-23 PROCEDURE — 86803 HEPATITIS C AB TEST: CPT

## 2021-01-23 PROCEDURE — 80307 DRUG TEST PRSMV CHEM ANLYZR: CPT

## 2021-01-23 PROCEDURE — 84484 ASSAY OF TROPONIN QUANT: CPT

## 2021-01-23 PROCEDURE — 82150 ASSAY OF AMYLASE: CPT

## 2021-01-23 PROCEDURE — 82550 ASSAY OF CK (CPK): CPT

## 2021-01-23 PROCEDURE — 86709 HEPATITIS A IGM ANTIBODY: CPT

## 2021-01-23 PROCEDURE — 81001 URINALYSIS AUTO W/SCOPE: CPT

## 2021-01-23 PROCEDURE — 99285 EMERGENCY DEPT VISIT HI MDM: CPT

## 2021-01-23 PROCEDURE — 85379 FIBRIN DEGRADATION QUANT: CPT

## 2021-01-23 PROCEDURE — 83690 ASSAY OF LIPASE: CPT

## 2021-01-23 PROCEDURE — 36415 COLL VENOUS BLD VENIPUNCTURE: CPT

## 2021-01-23 PROCEDURE — 85610 PROTHROMBIN TIME: CPT

## 2021-01-23 NOTE — PHYS DOC
Past History


Past Medical History:  No Pertinent History, Gallstones, GERD


Past Medical History


Hearing deficits


Past Surgical History:  Other


Additional Past Surgical Histo:  9 EAR SURGERYS AND cleft palate


Alcohol Use:  Rarely


Drug Use:  None





General Adult


HPI:


HPI:


".. I been sick since maybe Thursday.. my stomach area, nausea..."





Patient is a 30 year old male who presents with above hx and complaints abdomen 

pain.  No travel, bad food or specific ill contacts.  No dark or stool.  No 

recent travel.  No history of trauma..   Patient localizes pain in epigastric 

and right upper quadrant area.  Patient has been evaluated before for abdomen 

pain, biliary colic, hepatic steatosis, gallbladder sludge, constipation.  

Patient has had GERD in the past.-Magnesium and dehydration last such visit was 

on 2019.  Patient states nothing he does relieve the discomfort.  

Deep breath makes the discomfort worse.





Review of Systems:


Review of Systems:


Constitutional:  Denies fever or chills 


Eyes:  Denies change in visual acuity 


HENT:  Denies nasal congestion or sore throat 


Respiratory:  Denies cough or shortness of breath 


Cardiovascular:  Denies chest pain or edema 


GI: Complains of abdominal pain, nausea,.  Denies vomiting, bloody stools or 

diarrhea 


: Denies dysuria 


Musculoskeletal:  Denies back pain or joint pain 


Integument:  Denies rash 


Neurologic:  Denies headache, focal weakness or sensory changes 


Endocrine:  Denies polyuria or polydipsia 


Lymphatic:  Denies swollen glands 


Psychiatric:  Denies depression or anxiety





Family History:


Family History:


Noncontributory to presentation





Current Medications:


Current Meds:


See nursing for home meds





Allergies:


Allergies:





Allergies








Coded Allergies Type Severity Reaction Last Updated Verified


 


  amoxicillin Allergy Intermediate  19 Yes


 


  cefaclor Allergy Intermediate  19 Yes


 


  clavulanic acid Allergy Intermediate  19 Yes


 


  vancomycin Allergy Intermediate RED WELTS ON ABDOMEN 20 Yes


 


  Penicillins Allergy Unknown  20 Yes











Physical Exam:


PE:





Constitutional: Moderate acute distress, non-toxic appearance. []


HENT: Normocephalic, atraumatic, bilateral external ears normal, oropharynx 

moist, no oral exudates, nose normal. []


Eyes: PERRLA, EOMI, conjunctiva normal, no discharge. [] 


Neck: Normal range of motion, no tenderness, supple, no stridor. [] 


Cardiovascular:Heart rate regular rhythm, no murmur []


Lungs & Thorax:  Bilateral breath sounds equal apex on auscultation []


Abdomen: Bowel sounds normal, soft, epigastric and right upper quadrant 

tenderness, no masses, no pulsatile masses.  Surgery scar] 


Skin: Warm, dry, no erythema, no rash. [] 


Back: No tenderness, no CVA tenderness. [] 


Extremities: No tenderness, no cyanosis, no clubbing, ROM intact, no edema. [No 

psoas sign.


Neurologic: Alert and oriented X 3, normal motor function, normal sensory 

function, no focal deficits noted. []


Psychologic: Affect anxious, judgement normal, mood normal. []





EKG:


EKG:


My interpretation of EKG shows a sinus rhythm 86 bpm.  No findings of acute 

STEMI with contralateral changes.  There are some baseline variability due to 

patient movement []





Radiology/Procedures:


Radiology/Procedures:


[facility] [address], [place] [address]


                                 [phone]


                                        


                                 IMAGING REPORT





                                     Signed





PATIENT: RAÚL NAVARRO SACCOUNT: [id number]     MRN#: [id number]


: 1990           LOCATION: ER              AGE: 30


SEX: M                    EXAM DT: 21         ACCESSION#: [id number]


STATUS: REG ER            ORD. PHYSICIAN: MAXIME MURRAY MD


REASON: Abdomen pain  Omni 300 75cc


PROCEDURE: CT ABD PELV W/ORAL&IV CONTRAST





Exam performed: CT scan of the abdomen and pelvis with contrast





Clinical Indication:Left pelvic pain 





Date of Service:2021 comparison: CT abdomen and pelvis from 2020





Technique: Contiguous helical acquisitions are obtained  from the lung bases to 

the pelvis during intravenous administration of [75 mL of Isovue-320].  In 

addition oral contrast was also given. Sagittal and coronal reformatted images 

were obtained and reviewed. 





CT abdomen and pelvis findings:





The lung bases appear essentially clear. Visualized heart is normal.





Mild hepatic steatosis without focal lesions. The spleen, pancreas and 

gallbladder appear normal. Both adrenal glands and bilateral kidneys are normal 

in size with symmetric excretion of contrast via both kidneys. Aorta is normal 

in caliber. There is a small approximately 7 mm aortocaval lymph node. This 

small bowel loops are nondilated and unremarkable. There are several mildly 

enlarged mesenteric lymph nodes at the root of mesentery, the largest lymph node

measures up to 8 mm in short axis dimension visualized appendix is normal. No 

inflammatory changes seen in the right lower quadrant. There is diffuse scat

tered stool predominantly in the rectosigmoid region. The urinary bladder is 

distended. Prostate gland, seminal vesicles and rectum appear normal.





Sagittal and coronal  reformatted images were obtained and reviewed which 

demonstrate no additional findings. 





Impression abdomen and pelvis :





 1. No acute intra-abdominal or pelvic process is detected.


2. Diffuse scattered stool throughout the colon including rectosigmoid region. 

Correlate clinically for constipation.


3. Mildly enlarged mesenteric and retroperitoneal lymph nodes of unclear 

clinical significance. If indicated short-term interval follow-up CT of the 

abdomen and pelvis may be obtained to ensure interval resolution.








PQRS Compliance Statement:





One or more of the following individualized dose reduction techniques were 

utilized for this examination:


1. Automated exposure control


2. Adjustment of the mA and/or kV according to patient size


3. Use of iterative reconstruction technique





Electronically signed by: Audrey Lara MD (2021 9:56 PM) [facility]














DICTATED AND SIGNED BY:     AUDREY LARA MD


DATE:     21





CC: MAXIME MURRAY MD; PCP,NO ~MTH0 0


[][facility] [address], [place] [address]


                                 [phone]


                                        


                                 IMAGING REPORT





                                     Signed





PATIENT: RAÚL NAVARROOUNT: [id number]     MRN#: [id number]


: 1990           LOCATION: ER              AGE: 30


SEX: M                    EXAM DT: 21         ACCESSION#: [id number]


STATUS: REG ER            ORD. PHYSICIAN: MAXIME MURRAY MD


REASON: Abdomen pain, nausea x 3 days


PROCEDURE: ACUTE ABDOMEN SERIES








ACUTE ABDOMEN SERIES





Indication: Abdominal pain and nausea for 3 days





Date of service:2021 .Comparison: None acute abdominal series from 2020





Procedure: PA chest and upright and supine abdomen views are obtained.





Findings: 





Chest:  Cardiac size and pulmonary vessels are normal. Pneumonia, pneumothorax 

or pleural effusion are not present. Bones are normal 





Abdomen: No evidence of free air is present. Gas pattern is normal .





Impression: 





Normal Chest . 


Normal abdomen without obstruction, ileus or free air  .





If indicated, CT scan of the abdomen would be useful for further evaluation.





Electronically signed by: Audrey Lara MD (2021 7:17 PM) [facility]














DICTATED AND SIGNED BY:     AUDREY LARA MD


DATE:     21





CC: MAXIME MURRAY MD; PCP,NO ~MTH0 0





Heart Score:


HEART Score for Chest Pain:  








HEART Score for Chest Pain Response (Comments) Value


 


History Slighlty/Non-Suspicious 0


 


ECG Normal 0


 


Age < 45 0


 


Risk Factors                            1 or 2 Risk Factors 1


 


Troponin < Normal Limit 0


 


Total  1








Risk Factors:


Risk Factors:  DM, Current or recent (<one month) smoker, HTN, HLP, family 

history of CAD, obesity.


Risk Scores:


Score 0 - 3:  2.5% MACE over next 6 weeks - Discharge Home


Score 4 - 6:  20.3% MACE over next 6 weeks - Admit for Clinical Observation


Score 7 - 10:  72.7% MACE over next 6 weeks - Early Invasive Strategies





Course & Med Decision Making:


Course & Med Decision Making


Pertinent Labs and Imaging studies reviewed. (See chart for details)





Patient remain on clear fluid diet for the next 24 to 48 hours.  No solids.  No 

milk products.  Allow bowel rest.  Take Tylenol and ibuprofen for pain.  May 

take Zofran 8 mg at 4 times a day for nausea and vomiting.  Take Flagyl 500 mg 3

 times a day and Cipro 500 mg twice a day.  Follow-up pending labs and Covid 

testing.  Self isolate.  Wear a mask that covers nose and mouth at all times 

when away from home.  Reexam if no improvement the next 2 to 3 days.  Follow-up 

with primary must.











Impression: 





1. Abdomen pain


2.  Mesenteric adenopathy


3. Leukocytosis 11.6


4. Elevated AST, ALT, Alkphos-  48/174/144


5  Viral Syndrome 


6. Mild elevation D-dimer





[]





Dragon Disclaimer:


Dragon Disclaimer:


This electronic medical record was generated, in whole or in part, using a voice

 recognition dictation system.





Departure


Departure:


Referrals:  


PCP,RYLEE (PCP)


Scripts


Ondansetron Hcl (ZOFRAN) 4 Mg Tablet


8 MG PO QIDPRN for nv, #30 TAB


   Prov: MAXIME MURRAY MD         21 


Metronidazole (FLAGYL) 500 Mg Tablet


500 MG PO TID for mesentericadenopathy for 10 Days, #30 TAB


   Prov: MAXIME MURRAY MD         21 


Ciprofloxacin (CIPRO) 500 Mg/5 Ml [email or web address]


500 MG PO BID for mesenteric adenopathyh for 7 Days, MISC


   Prov: MAXIME MURRAY MD         21





Dragon Disclaimer


This chart was dictated in whole or in part using Voice Recognition software in 

a busy, high-work load, and often noisy Emergency Department environment.  It 

may contain unintended and wholly unrecognized errors or omissions.





Dragon Disclaimer


This chart was dictated in whole or in part using Voice Recognition software in 

a busy, high-work load, and often noisy Emergency Department environment.  It 

may contain unintended and wholly unrecognized errors or omissions.











MAXIME MURRAY MD           2021 17:57

## 2021-01-23 NOTE — EKG
[facility] [address], [place] [address]

Test Date:    2021               Test Time:    18:38:07

Pat Name:     RAÚL NAVARRO      Department:   

Patient ID:   [id number]           Room:          

Gender:                               Technician:   

:          1990               Requested By: MAXIME MURRAY

Order Number: [id number]            Reading MD:     

                                 Measurements

Intervals                              Axis          

Rate:         86                       P:            28

PA:           156                      QRS:          2

QRSD:         106                      T:            25

QT:           344                                    

QTc:          414                                    

                           Interpretive Statements

SINUS RHYTHM

NORMAL ECG

RI6.02

No previous ECG available for comparison

## 2021-04-20 ENCOUNTER — HOSPITAL ENCOUNTER (EMERGENCY)
Dept: HOSPITAL 63 - ER | Age: 31
Discharge: HOME | End: 2021-04-20
Payer: SELF-PAY

## 2021-04-20 VITALS — BODY MASS INDEX: 33.13 KG/M2 | WEIGHT: 198.86 LBS | HEIGHT: 65 IN

## 2021-04-20 VITALS — DIASTOLIC BLOOD PRESSURE: 85 MMHG | SYSTOLIC BLOOD PRESSURE: 131 MMHG
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
## 2021-04-20 DIAGNOSIS — Z88.0: ICD-10-CM
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
## 2021-04-20 DIAGNOSIS — K21.9: ICD-10-CM

## 2021-04-20 DIAGNOSIS — Z88.1: ICD-10-CM

## 2021-04-20 DIAGNOSIS — S01.21XA: Primary | ICD-10-CM
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
## 2021-04-20 PROCEDURE — 99282 EMERGENCY DEPT VISIT SF MDM: CPT

## 2021-04-20 PROCEDURE — 12011 RPR F/E/E/N/L/M 2.5 CM/<: CPT

## 2021-04-20 NOTE — PHYS DOC
Past History


Past Medical History:  No Pertinent History, Gallstones, GERD


Additional Past Medical Histor:  legally deaf, cleft pallet, stroke in 2020.


Past Surgical History:  Other


Additional Past Surgical Histo:  reapir to ears bilaterally x 9.


Alcohol Use:  None


Drug Use:  None





Adult General


HPI


HPI


Patient is a 31-year-old male, otherwise healthy and up-to-date on tetanus 

status who presents with a small laceration on his nose.  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

ssional wrestler, and scratched it the other day.  States that it did scab over 

and was healing but he scratched his nose while wrestling and it began to bleed 

again.  Denies any other injuries.





Review of Systems


Review of Systems


Review of systems otherwise unremarkable except noted in HPI





Allergies


Allergies





Allergies








Coded Allergies Type Severity Reaction Last Updated Verified


 


  Penicillins Allergy Intermediate  1/23/21 Yes


 


  amoxicillin Allergy Intermediate  11/12/19 Yes


 


  cefaclor Allergy Intermediate  11/12/19 Yes


 


  clavulanic acid Allergy Intermediate  11/12/19 Yes


 


  vancomycin Allergy Intermediate RED WELTS ON ABDOMEN 5/8/20 Yes











Physical Exam


Physical Exam





Constitutional: Well developed, well nourished, no acute distress, non-toxic 

appearance. []


HENT: Normocephalic, atraumatic, bilateral external ears normal, oropharynx m

oist, no oral exudates, nose normal.  Left outer nare has an approximately 1/2 

to 1 cm very superficial laceration.  []


Eyes: PERRLA, EOMI, conjunctiva normal, no discharge. [] 


Neurologic: Alert and oriented X 3, normal motor function, normal sensory 

function, no focal deficits noted. []


Psychologic: Affect normal, judgement normal, mood normal. []





EKG


EKG


[]





Radiology/Procedures


Radiology/Procedures


[]





Heart Score


C/O Chest Pain:  No


Risk Factors:


Risk Factors:  DM, Current or recent (<one month) smoker, HTN, HLP, family 

history of CAD, obesity.


Risk Scores:


Risk Factors:  DM, Current or recent (<one month) smoker, HTN, HLP, family 

history of CAD, obesity.





Course & Med Decision Making


Course & Med Decision Making


Patient is a 31-year-old male who presents with nasal laceration


Vital signs not concerning.  Physical exam noted above.


Patient up-to-date on tetanus.  Wound cleaned.  Sealed with Dermabond.


Advised to follow-up with primary care as needed.  Gave return precautions to 

the ED.


Patient grateful, verbalized understanding and agreed with plan of discharge.





[]





Dragon Disclaimer


Dragon Disclaimer


This electronic medical record was generated, in whole or in part, using a voice

 recognition dictation system.





Departure


Departure:


Impression:  


   Primary Impression:  


   Nasal laceration


Disposition:  01 HOME / SELF CARE / HOMELESS


Condition:  GOOD


Referrals:  


PCP,NO (PCP)








TRUDI TIRADO MD


Patient Instructions:  Tissue Adhesive Wound Care





Additional Instructions:  


Please read all the attached information carefully.  Please keep the area clean 

and dry.  Please refrain from any activity that could reinjure it as discussed. 

 Please follow-up with your primary care as needed.  Please return to the ED 

with new or concerning symptoms.











ANDREW LOMELI MD               Apr 20, 2021 03:10

## 2021-05-16 ENCOUNTER — HOSPITAL ENCOUNTER (EMERGENCY)
Dept: HOSPITAL 63 - ER | Age: 31
Discharge: HOME | End: 2021-05-16
Payer: SELF-PAY

## 2021-05-16 VITALS — SYSTOLIC BLOOD PRESSURE: 136 MMHG | DIASTOLIC BLOOD PRESSURE: 101 MMHG

## 2021-05-16 VITALS — BODY MASS INDEX: 32.32 KG/M2 | WEIGHT: 194.01 LBS | HEIGHT: 65 IN
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
## 2021-05-16 DIAGNOSIS — M79.631: ICD-10-CM

## 2021-05-16 DIAGNOSIS — Z88.0: ICD-10-CM
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
## 2021-05-16 DIAGNOSIS — K21.9: ICD-10-CM
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
## 2021-05-16 DIAGNOSIS — Z88.6: ICD-10-CM

## 2021-05-16 DIAGNOSIS — M25.531: Primary | ICD-10-CM

## 2021-05-16 DIAGNOSIS — Z88.1: ICD-10-CM

## 2021-05-16 PROCEDURE — 73090 X-RAY EXAM OF FOREARM: CPT

## 2021-05-16 PROCEDURE — 73110 X-RAY EXAM OF WRIST: CPT

## 2021-05-16 NOTE — RAD
XR RT WRIST 3VIEWS, XR FOREARM_RIGHT 2 VIEWS 5/16/2021 9:09 PM



INDICATION: Trauma, fall. Distal forearm pain



COMPARISON: None available.



TECHNIQUE:  3 views of the right wrist and 2 views of right forearm are provided.



FINDINGS/

IMPRESSION:

There is no acute fracture or dislocation. Joint spaces are maintained. Bone mineralization is within
 normal limits. Regional soft tissues are within normal limits. There is no soft tissue gas or osseou
s erosion. No radiopaque foreign body.



Electronically signed by: Ana Silva MD (5/16/2021 9:50 PM) [facility]

## 2021-05-16 NOTE — PHYS DOC
Past History


Past Medical History:  No Pertinent History, Gallstones, GERD


Additional Past Medical Histor:  legally deaf, cleft pallet, stroke in 2020.


Past Surgical History:  Other


Additional Past Surgical Histo:  reapir to ears bilaterally x 9.


Alcohol Use:  None


Drug Use:  None





Adult General


Chief Complaint


Chief Complaint:  WRIST PAIN





HPI


HPI


Patient is a 31-year-old male who presents with right wrist and forearm pain.  

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 

outstretched hand and felt as if he injured these 2.  States that the pain is 5 

out of 10, dull and achy in nature with no radiation.  Denies any other 

injuries.  Denies any numbness/weakness/tingling.  States he did take some 

Tylenol.





Review of Systems


Review of Systems


Review of systems otherwise unremarkable except noted in HPI





Allergies


Allergies





Allergies








Coded Allergies Type Severity Reaction Last Updated Verified


 


  Penicillins Allergy Intermediate  4/20/21 Yes


 


  amoxicillin Allergy Intermediate  4/20/21 Yes


 


  cefaclor Allergy Intermediate  4/20/21 Yes


 


  clavulanic acid Allergy Intermediate  4/20/21 Yes


 


  vancomycin Allergy Intermediate RED WELTS ON ABDOMEN 4/20/21 Yes











Physical Exam


Physical Exam





Constitutional: Well developed, well nourished, no acute distress, non-toxic 

appearance. []


HENT: Normocephalic, atraumatic, bilateral external ears normal, oropharynx 

moist, no oral exudates, nose normal. []


Eyes: conjunctiva normal, no discharge. [] 


Neck: Normal range of motion, no tenderness, supple, no stridor. [] 


Back: No tenderness, 


Extremities: No tenderness, no cyanosis, no clubbing, ROM intact, no edema. [] 


Neurologic: Alert and oriented X 3, normal motor function, normal sensory 

function, no focal deficits noted. []


Psychologic: Affect normal, judgement normal, mood normal. []





Current Patient Data


Vital Signs





                                   Vital Signs








  Date Time  Temp Pulse Resp B/P (MAP) Pulse Ox O2 Delivery O2 Flow Rate FiO2


 


5/16/21 20:09 98.8 93 18 131/83 (99) 98 Room Air  











EKG


EKG


[]





Radiology/Procedures


Radiology/Procedures


[]





Heart Score


C/O Chest Pain:  No


Risk Factors:


Risk Factors:  DM, Current or recent (<one month) smoker, HTN, HLP, family 

history of CAD, obesity.


Risk Scores:


Risk Factors:  DM, Current or recent (<one month) smoker, HTN, HLP, family 

history of CAD, obesity.





Course & Med Decision Making


Course & Med Decision Making


Patient is a 31-year-old male who presents with right wrist and forearm pain 

after injuring it during wrestling concern for break


Vital signs not concerning.  Physical exam noted above.  Denied need for 

Tylenol, ibuprofen.  Given ice pack.


Imaging noted above with no acute osseous abnormalities.  Gave instructions on 

symptom control at home.


Advised to follow-up with primary care as needed.  Gave return precautions to 

the ED.


Patient grateful, verbalized understanding and agreed with plan of discharge.


[]





Dragon Disclaimer


Dragon Disclaimer


This electronic medical record was generated, in whole or in part, using a voice

 recognition dictation system.





Departure


Departure:


Impression:  


   Primary Impression:  


   Wrist pain


   Additional Impression:  


   Forearm pain


Disposition:  01 HOME / SELF CARE / HOMELESS


Condition:  GOOD


Referrals:  


PCP,RYLEE (PCP)








TRUDI TIRADO MD


Patient Instructions:  RICE - Routine Care for Injuries





Additional Instructions:  


Please read all the attached information very carefully for management at home. 

 Please follow-up with your primary care physician as soon as you can to discuss

 your ED visit.  Your imaging did not show any fractured/broken bones.  Please 

come back to the ED with new or concerning symptoms as discussed.





Problem Qualifiers











ANDREW LOMELI MD               May 16, 2021 21:54

## 2022-01-07 ENCOUNTER — HOSPITAL ENCOUNTER (EMERGENCY)
Dept: HOSPITAL 63 - ER | Age: 32
Discharge: HOME | End: 2022-01-07
Payer: SELF-PAY

## 2022-01-07 VITALS — BODY MASS INDEX: 32.32 KG/M2 | HEIGHT: 65 IN | WEIGHT: 194.01 LBS

## 2022-01-07 VITALS — SYSTOLIC BLOOD PRESSURE: 117 MMHG | DIASTOLIC BLOOD PRESSURE: 87 MMHG
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
## 2022-01-07 DIAGNOSIS — H66.92: ICD-10-CM

## 2022-01-07 DIAGNOSIS — T16.2XXA: Primary | ICD-10-CM

## 2022-01-07 DIAGNOSIS — Z88.1: ICD-10-CM

## 2022-01-07 DIAGNOSIS — Z88.0: ICD-10-CM

## 2022-01-07 DIAGNOSIS — K21.9: ICD-10-CM
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
## 2022-01-07 PROCEDURE — 69200 CLEAR OUTER EAR CANAL: CPT

## 2022-01-07 PROCEDURE — 99284 EMERGENCY DEPT VISIT MOD MDM: CPT

## 2022-01-07 NOTE — PHYS DOC
Past History


Past Medical History:  No Pertinent History, Gallstones, GERD


Additional Past Medical Histor:  legally deaf, cleft pallet, stroke in 2020, 

chronic infection L ear


 (KAMILA ALVARADO)


Past Surgical History:  Other


Additional Past Surgical Histo:  reapir to ears bilaterally x 9.


 (KAMILA ALVARADO)


Alcohol Use:  None


Drug Use:  None


 (KAMILA ALVARADO)





General Adult


EDM:


Chief Complaint:  FOREIGN BODY





HPI:


HPI:





Patient is a 31 year old male who presents with the cotton portion of a cotton 

swab stuck in his left ear.  Patient reports he has had a chronic ear infection 

for 6 years in his left ear after multiple reconstructive surgeries.  He has 

undergone several rounds of various antibiotics without symptom improvement.  He

was recently told after CT scan that the infection had "spread to his skull."  

He uses Q-tips to clean the drainage from his ear secondary to the infection.  

Today, when he inserted the Q-tip to his ear canal to absorb drainage, the 

cotton end remained inside of his ear.  Patient denies pain, but he states he 

can "hear it moving" when he talks.


 (KAMILA ALVARADO)





Review of Systems:


Review of Systems:


ROS negative or noncontributory except as mentioned in HPI.


 (KAMILA ALVARADO)





Allergies:


Allergies:





Allergies








Coded Allergies Type Severity Reaction Last Updated Verified


 


  Penicillins Allergy Intermediate  4/20/21 Yes


 


  amoxicillin Allergy Intermediate  4/20/21 Yes


 


  cefaclor Allergy Intermediate  4/20/21 Yes


 


  clavulanic acid Allergy Intermediate  4/20/21 Yes


 


  vancomycin Allergy Intermediate RED WELTS ON ABDOMEN 4/20/21 Yes








 (KAMILA ALVARADO)





Physical Exam:


PE:





Constitutional: Well developed, well nourished, no acute distress, non-toxic 

appearance. 


HENT: Normocephalic, atraumatic, right external ear without obvious deformity or

discharge, left external ear with clear liquid discharge and few drops of blood,

left ear canal opening extremely small secondary to chronic swelling (TM not 

visualized), white foreign body barely visible with otoscope, right ear canal 

with minimal swelling and no discharge, TM pearly gray without bulging or fluid 

level, oropharynx moist, no oral exudates, nose normal. 


Cardiovascular: Heart rate regular rhythm, no murmur.


Lungs & Thorax: Bilateral breath sounds clear to auscultation.


 (KAMILA ALVARADO)





Current Patient Data:


Vital Signs:





                                   Vital Signs








  Date Time  Temp Pulse Resp B/P (MAP) Pulse Ox O2 Delivery O2 Flow Rate FiO2


 


1/7/22 14:20 98.2 88 16 117/87 (97) 99 Room Air  








 (KAMILA ALVARADO)





Heart Score:


C/O Chest Pain:  No


 (KAMILA ALVARADO)





Course & Med Decision Making:


Course & Med Decision Making


Pertinent Labs and Imaging studies reviewed. (See chart for details)





Patient is a 31-year-old male with chronic left ear infection who presents with 

retained foreign body in his left ear canal.  Cotton swab tip visualized at ear 

canal opening.





Alligator forceps used to remove foreign body from ear canal.  Patient states he

has undergone several rounds of antibiotic therapy for his ear infection without

symptom improvement.  He plans to see ENT specialist for further evaluation and 

management.  Strongly advised to do so.  Patient was given return precautions.  

He understands and is agreeable to discharge plan.


 (KAMILA ALVARADO)


Course & Med Decision Making


I was the Attending physician on the above date of service of this patient. This

patient was evaluated, examined, treated, and dispositioned from the emergency 

department by the mid-level practitioner.  Although I was working at the time , 

no assistance was requested. 





Electronically signed, Elsie Bacon DO


 (ELSIE BACON DO)


Precious Disclaimer:


Dragon Disclaimer:


This electronic medical record was generated, in whole or in part, using a voice

recognition dictation system.


 (KAMILA ALVARADO)





Departure


Departure:


Impression:  


   Primary Impression:  


   Foreign body in left ear, initial encounter


   Additional Impression:  


   Chronic infection of left ear


Disposition:  01 HOME / SELF CARE / HOMELESS


Condition:  STABLE


Referrals:  


PCP,RYLEE (PCP)








TANISHA EVANS DO


Patient Instructions:  Ear Foreign Body, Easy-to-Read





Additional Instructions:  


Dr. Jori Matthews MD


Otolaryngology (ENT)


[facility]


[address] [address] [phone]





Avoid pushing cotton swabs into the ear canal. You may clean the auricle and 

outside of the ear, but do not insert objects past the opening of the canal. 

Return for worsening symptoms or other concerns.











KAMILA ALVARADO               Jan 7, 2022 16:34


ELSIE BACON DO                  Jan 8, 2022 06:43